# Patient Record
Sex: MALE | Race: WHITE | Employment: FULL TIME | URBAN - METROPOLITAN AREA
[De-identification: names, ages, dates, MRNs, and addresses within clinical notes are randomized per-mention and may not be internally consistent; named-entity substitution may affect disease eponyms.]

---

## 2020-09-16 ENCOUNTER — HOSPITAL ENCOUNTER (EMERGENCY)
Age: 50
Discharge: HOME | End: 2020-09-16
Attending: EMERGENCY MEDICINE | Admitting: EMERGENCY MEDICINE
Payer: COMMERCIAL

## 2020-09-16 VITALS
RESPIRATION RATE: 14 BRPM | OXYGEN SATURATION: 100 % | WEIGHT: 177 LBS | BODY MASS INDEX: 21.55 KG/M2 | HEART RATE: 58 BPM | HEIGHT: 76 IN | SYSTOLIC BLOOD PRESSURE: 140 MMHG | DIASTOLIC BLOOD PRESSURE: 75 MMHG | TEMPERATURE: 97.5 F

## 2020-09-16 DIAGNOSIS — H81.12 BPPV (BENIGN PAROXYSMAL POSITIONAL VERTIGO), LEFT: Primary | ICD-10-CM

## 2020-09-16 LAB
ANION GAP SERPL CALC-SCNC: 8 MMOL/L (ref 5–15)
BASOPHILS # BLD: 0.1 K/UL (ref 0–0.1)
BASOPHILS NFR BLD: 1 % (ref 0–1)
BUN SERPL-MCNC: 17 MG/DL (ref 6–20)
BUN/CREAT SERPL: 16 (ref 12–20)
CALCIUM SERPL-MCNC: 9.8 MG/DL (ref 8.5–10.1)
CHLORIDE SERPL-SCNC: 105 MMOL/L (ref 97–108)
CO2 SERPL-SCNC: 23 MMOL/L (ref 21–32)
COMMENT, HOLDF: NORMAL
CREAT SERPL-MCNC: 1.06 MG/DL (ref 0.7–1.3)
DIFFERENTIAL METHOD BLD: ABNORMAL
EOSINOPHIL # BLD: 0.3 K/UL (ref 0–0.4)
EOSINOPHIL NFR BLD: 3 % (ref 0–7)
ERYTHROCYTE [DISTWIDTH] IN BLOOD BY AUTOMATED COUNT: 11.9 % (ref 11.5–14.5)
GLUCOSE BLD STRIP.AUTO-MCNC: 114 MG/DL (ref 65–100)
GLUCOSE SERPL-MCNC: 131 MG/DL (ref 65–100)
HCT VFR BLD AUTO: 42.4 % (ref 36.6–50.3)
HGB BLD-MCNC: 15.1 G/DL (ref 12.1–17)
IMM GRANULOCYTES # BLD AUTO: 0 K/UL (ref 0–0.04)
IMM GRANULOCYTES NFR BLD AUTO: 0 % (ref 0–0.5)
LYMPHOCYTES # BLD: 1.8 K/UL (ref 0.8–3.5)
LYMPHOCYTES NFR BLD: 17 % (ref 12–49)
MAGNESIUM SERPL-MCNC: 2.1 MG/DL (ref 1.6–2.4)
MCH RBC QN AUTO: 30.8 PG (ref 26–34)
MCHC RBC AUTO-ENTMCNC: 35.6 G/DL (ref 30–36.5)
MCV RBC AUTO: 86.5 FL (ref 80–99)
MONOCYTES # BLD: 0.9 K/UL (ref 0–1)
MONOCYTES NFR BLD: 8 % (ref 5–13)
NEUTS SEG # BLD: 7.2 K/UL (ref 1.8–8)
NEUTS SEG NFR BLD: 71 % (ref 32–75)
NRBC # BLD: 0 K/UL (ref 0–0.01)
NRBC BLD-RTO: 0 PER 100 WBC
PLATELET # BLD AUTO: 417 K/UL (ref 150–400)
PMV BLD AUTO: 10.4 FL (ref 8.9–12.9)
POTASSIUM SERPL-SCNC: 3.6 MMOL/L (ref 3.5–5.1)
RBC # BLD AUTO: 4.9 M/UL (ref 4.1–5.7)
SAMPLES BEING HELD,HOLD: NORMAL
SERVICE CMNT-IMP: ABNORMAL
SODIUM SERPL-SCNC: 136 MMOL/L (ref 136–145)
TROPONIN I SERPL-MCNC: <0.05 NG/ML
WBC # BLD AUTO: 10.2 K/UL (ref 4.1–11.1)

## 2020-09-16 PROCEDURE — 85025 COMPLETE CBC W/AUTO DIFF WBC: CPT

## 2020-09-16 PROCEDURE — 74011250636 HC RX REV CODE- 250/636: Performed by: EMERGENCY MEDICINE

## 2020-09-16 PROCEDURE — 80048 BASIC METABOLIC PNL TOTAL CA: CPT

## 2020-09-16 PROCEDURE — 96374 THER/PROPH/DIAG INJ IV PUSH: CPT

## 2020-09-16 PROCEDURE — 99285 EMERGENCY DEPT VISIT HI MDM: CPT

## 2020-09-16 PROCEDURE — 82962 GLUCOSE BLOOD TEST: CPT

## 2020-09-16 PROCEDURE — 93005 ELECTROCARDIOGRAM TRACING: CPT

## 2020-09-16 PROCEDURE — 84484 ASSAY OF TROPONIN QUANT: CPT

## 2020-09-16 PROCEDURE — 96375 TX/PRO/DX INJ NEW DRUG ADDON: CPT

## 2020-09-16 PROCEDURE — 83735 ASSAY OF MAGNESIUM: CPT

## 2020-09-16 RX ORDER — MECLIZINE HYDROCHLORIDE 25 MG/1
25 TABLET ORAL
Qty: 30 TAB | Refills: 0 | Status: SHIPPED | OUTPATIENT
Start: 2020-09-16 | End: 2020-09-26

## 2020-09-16 RX ORDER — ONDANSETRON 4 MG/1
4 TABLET, ORALLY DISINTEGRATING ORAL
Qty: 12 TAB | Refills: 0 | Status: SHIPPED | OUTPATIENT
Start: 2020-09-16

## 2020-09-16 RX ORDER — MECLIZINE HYDROCHLORIDE 25 MG/1
50 TABLET ORAL
Status: COMPLETED | OUTPATIENT
Start: 2020-09-16 | End: 2020-09-16

## 2020-09-16 RX ORDER — DIAZEPAM 10 MG/2ML
5 INJECTION INTRAMUSCULAR
Status: COMPLETED | OUTPATIENT
Start: 2020-09-16 | End: 2020-09-16

## 2020-09-16 RX ORDER — DIAZEPAM 5 MG/1
5 TABLET ORAL
Qty: 6 TAB | Refills: 0 | Status: SHIPPED | OUTPATIENT
Start: 2020-09-16

## 2020-09-16 RX ORDER — ONDANSETRON 2 MG/ML
4 INJECTION INTRAMUSCULAR; INTRAVENOUS
Status: COMPLETED | OUTPATIENT
Start: 2020-09-16 | End: 2020-09-16

## 2020-09-16 RX ADMIN — DIAZEPAM 5 MG: 5 INJECTION, SOLUTION INTRAMUSCULAR; INTRAVENOUS at 17:28

## 2020-09-16 RX ADMIN — ONDANSETRON 4 MG: 2 INJECTION INTRAMUSCULAR; INTRAVENOUS at 16:08

## 2020-09-16 RX ADMIN — MECLIZINE HYDROCHLORIDE 50 MG: 25 TABLET ORAL at 16:24

## 2020-09-16 NOTE — ED TRIAGE NOTES
Patient reports feeling nauseous, weak, and lightheaded at work today with episode of vomiting. Reports \" it feels like my hands and arms are vibrating\". Patient to triage in wheel chair with head in lap, and states he feels like the room is spinning.

## 2020-09-16 NOTE — ED PROVIDER NOTES
Dizziness   This is a new problem. The current episode started 3 to 5 hours ago. The problem has been rapidly worsening. Affected Side: worse with leftward motion. Pertinent negatives include no focal weakness, no loss of sensation, no speech difficulty, no visual change, no mental status change and no disorientation. There has been no fever. Associated symptoms include nausea. Pertinent negatives include no altered mental status. Associated medical issues do not include CVA. Nausea    This is a new problem. The current episode started 6 to 12 hours ago. The problem occurs continuously. The problem has been rapidly worsening. Pertinent negatives include no abdominal pain. Associated symptoms comments: Vomiting intermittently. Fatigue   This is a new problem. The current episode started 6 to 12 hours ago. The problem has not changed since onset. Pertinent negatives include no focal weakness, no loss of sensation, no speech difficulty, no visual change, no mental status change and no disorientation. Associated symptoms include nausea. Pertinent negatives include no altered mental status. There were no medications administered prior to arrival. Associated medical issues do not include CVA.         Past Medical History:   Diagnosis Date    Depression     Ill-defined condition     chronic headaches    Migraines        Past Surgical History:   Procedure Laterality Date    HX ORTHOPAEDIC Left 2006    Partial Replacement Knee         Family History:   Problem Relation Age of Onset    Heart Disease Father     Hypertension Father        Social History     Socioeconomic History    Marital status:      Spouse name: Not on file    Number of children: Not on file    Years of education: Not on file    Highest education level: Not on file   Occupational History    Not on file   Social Needs    Financial resource strain: Not on file    Food insecurity     Worry: Not on file     Inability: Not on file   [place] Transportation needs     Medical: Not on file     Non-medical: Not on file   Tobacco Use    Smoking status: Never Smoker    Smokeless tobacco: Never Used   Substance and Sexual Activity    Alcohol use: Yes     Comment: socially    Drug use: Not on file    Sexual activity: Not on file   Lifestyle    Physical activity     Days per week: Not on file     Minutes per session: Not on file    Stress: Not on file   Relationships    Social connections     Talks on phone: Not on file     Gets together: Not on file     Attends Jewish service: Not on file     Active member of club or organization: Not on file     Attends meetings of clubs or organizations: Not on file     Relationship status: Not on file    Intimate partner violence     Fear of current or ex partner: Not on file     Emotionally abused: Not on file     Physically abused: Not on file     Forced sexual activity: Not on file   Other Topics Concern    Not on file   Social History Narrative    Not on file         ALLERGIES: Percocet [oxycodone-acetaminophen]    Review of Systems   Constitutional: Positive for fatigue. Gastrointestinal: Positive for nausea. Negative for abdominal pain. Neurological: Positive for dizziness. Negative for focal weakness and speech difficulty. All other systems reviewed and are negative. Vitals:    09/16/20 1535   BP: (!) 156/89   Pulse: (!) 110   Resp: 18   Temp: 97.5 °F (36.4 °C)   SpO2: 100%   Weight: 80.3 kg (177 lb)   Height: 6' 4\" (1.93 m)            Physical Exam  Vitals signs and nursing note reviewed. Constitutional:       General: He is not in acute distress. Appearance: He is well-developed. HENT:      Head: Normocephalic and atraumatic. Eyes:      Conjunctiva/sclera: Conjunctivae normal.   Neck:      Musculoskeletal: Neck supple. Trachea: No tracheal deviation. Cardiovascular:      Rate and Rhythm: Normal rate and regular rhythm.    Pulmonary:      Effort: Pulmonary effort is normal. No respiratory distress. Abdominal:      General: There is no distension. Musculoskeletal: Normal range of motion. General: No deformity. Skin:     General: Skin is warm and dry. Neurological:      Mental Status: He is alert. GCS: GCS eye subscore is 4. GCS verbal subscore is 5. GCS motor subscore is 6. Cranial Nerves: No cranial nerve deficit or facial asymmetry. Motor: Motor function is intact. Coordination: Coordination normal.      Comments: + left head impulse test, + left aden-halpike   Psychiatric:         Behavior: Behavior normal.          MDM     51 y.o. male presents with nausea starting earlier today and feeling poorly followed by waves of severe vertigo and nausea/vomiting. He has a left lateralized peripheral vertigo. No central features. No significant hematologic or metabolic abnormalities to explain symptoms. Cardiac workup unremarkable. Couldn't tolerate meclizine with persistent vomiting, resolved after IV valium. Provided meclizine and valium for home care of vertigo and discussed epley maneuvers although he failed to improve after 3 attempts here. Plan to follow up with PCP as needed and return precautions discussed for worsening or new concerning symptoms. Procedures    EKG 1556: Rate 64, Normal sinus rhythm, No ST segment or T wave abnormalities. Normal EKG.

## 2020-09-16 NOTE — ED NOTES
Pt reports nausea and dizziness \"are a little better, but I still get dizzy when I turn my head. \"

## 2020-09-16 NOTE — ED NOTES
Patient does not appear to be in any acute distress/shows no evidence of clinical instability at this time. Provider has reviewed discharge instructions with the patient/family. The patient/family verbalized understanding instructions as well as need for follow up for any further symptoms.     Discharge papers given, education provided, and any questions answered. Patient discharged by provider. Ambulatory out of department, unassisted. Accompanied by spouse.

## 2020-09-18 LAB
ATRIAL RATE: 64 BPM
CALCULATED P AXIS, ECG09: 51 DEGREES
CALCULATED R AXIS, ECG10: 72 DEGREES
CALCULATED T AXIS, ECG11: 59 DEGREES
DIAGNOSIS, 93000: NORMAL
P-R INTERVAL, ECG05: 148 MS
Q-T INTERVAL, ECG07: 460 MS
QRS DURATION, ECG06: 98 MS
QTC CALCULATION (BEZET), ECG08: 474 MS
VENTRICULAR RATE, ECG03: 64 BPM

## 2022-05-09 ENCOUNTER — OFFICE VISIT (OUTPATIENT)
Dept: ORTHOPEDIC SURGERY | Age: 52
End: 2022-05-09
Payer: COMMERCIAL

## 2022-05-09 DIAGNOSIS — M25.462 EFFUSION, LEFT KNEE: Primary | ICD-10-CM

## 2022-05-09 PROCEDURE — 20610 DRAIN/INJ JOINT/BURSA W/O US: CPT | Performed by: ORTHOPAEDIC SURGERY

## 2022-05-09 NOTE — PROGRESS NOTES
Willem Helms (: 1970) is a 49 y.o. male, patient, here for evaluation of the following chief complaint(s):  Knee Pain (left knee pain)       HPI:    She presents the office today with chief plaint of left knee pain. This patient well-known to my office. He has had recurrent effusion and further progression of osteoarthritis the left knee. With activity, he has noted some recurrent swelling of the knee. He is here today for further advice. Allergies   Allergen Reactions    Percocet [Oxycodone-Acetaminophen] Other (comments)     vomiting       Current Outpatient Medications   Medication Sig    diazePAM (Valium) 5 mg tablet Take 1 Tab by mouth every twelve (12) hours as needed (vertigo). Max Daily Amount: 10 mg.    ondansetron (Zofran ODT) 4 mg disintegrating tablet Take 1 Tab by mouth every eight (8) hours as needed for Nausea.  topiramate (TOPAMAX) 25 mg tablet Take 25 mg by mouth nightly.  FLUoxetine (PROZAC) 20 mg capsule Take 20 mg by mouth daily.  neomycin-bacitracnZn-polymyxnB (NEOSPORIN) 3.5-400-5,000 mg-unit-unit oipk ointment Apply 10 Packets to affected area two (2) times a day.  amoxicillin-clavulanate (AUGMENTIN) 875-125 mg per tablet Take 1 Tab by mouth two (2) times a day.  zolpidem (AMBIEN) 10 mg tablet Take  by mouth nightly as needed for Sleep.  MULTIVITAMIN PO Take  by mouth.  CALCIUM CARBONATE (ANTACID CALCIUM PO) Take  by mouth. No current facility-administered medications for this visit.        Past Medical History:   Diagnosis Date    Depression     Ill-defined condition     chronic headaches    Migraines         Past Surgical History:   Procedure Laterality Date    HX ORTHOPAEDIC Left 2006    Partial Replacement Knee       Family History   Problem Relation Age of Onset    Heart Disease Father     Hypertension Father         Social History     Socioeconomic History    Marital status:      Spouse name: Not on file    Number of children: Not on file    Years of education: Not on file    Highest education level: Not on file   Occupational History    Not on file   Tobacco Use    Smoking status: Never Smoker    Smokeless tobacco: Never Used   Substance and Sexual Activity    Alcohol use: Yes     Comment: socially    Drug use: Not on file    Sexual activity: Not on file   Other Topics Concern    Not on file   Social History Narrative    Not on file     Social Determinants of Health     Financial Resource Strain:     Difficulty of Paying Living Expenses: Not on file   Food Insecurity:     Worried About Running Out of Food in the Last Year: Not on file    [place] of Food in the Last Year: Not on file   Transportation Needs:     Lack of Transportation (Medical): Not on file    Lack of Transportation (Non-Medical): Not on file   Physical Activity:     Days of Exercise per Week: Not on file    Minutes of Exercise per Session: Not on file   Stress:     Feeling of Stress : Not on file   Social Connections:     Frequency of Communication with Friends and Family: Not on file    Frequency of Social Gatherings with Friends and Family: Not on file    Attends Tenriism Services: Not on file    Active Member of Clubs or Organizations: Not on file    Attends Club or Organization Meetings: Not on file    Marital Status: Not on file   Intimate Partner Violence:     Fear of Current or Ex-Partner: Not on file    Emotionally Abused: Not on file    Physically Abused: Not on file    Sexually Abused: Not on file   Housing Stability:     Unable to Pay for Housing in the Last Year: Not on file    Number of [place] in the Last Year: Not on file    Unstable Housing in the Last Year: Not on file       Review of Systems   Musculoskeletal:        Left knee pain       Vitals: There were no vitals taken for this visit. There is no height or weight on file to calculate BMI. Ortho Exam     Patient is alert and oriented x3.  Patient is in no acute distress. Patient ambulates with a nonantalgic gait. Left knee: Incision from prior surgery healed well. 2+ knee effusion. Mild valgus deformity. Crepitation throughout range of motion. He has no instability. His quad tone and strength is normal.  Neurovascular examination is intact. Right knee: There is no abrasions, lacerations, ecchymosis or soft tissue swelling. No effusion is identified. There is no pain to palpation along the medial or lateral border of the patella. There is no pain or crepitation with manipulation of the patella. There is normal excursion of the patella. Patellar grind test is negative. Active and passive range of motion is full and does not cause pain or crepitation. There is no pain with palpation along the medial femoral epicondyle or medial tibia and no pain with palpation over the lateral femoral epicondyle. There is no medial or lateral joint line tenderness. Manjinder's maneuver is negative. There is no collateral ligament instability. Anterior drawer, Lachman and posterior drawer are negative. There is no soft tissue swelling distally into the leg. Neurocirculatory examination is intact. Prior x-rays once again reveal evidence of prior partial knee replacement of the left with further  osteoarthritic changes    ASSESSMENT/PLAN:    Patient returns to the office with recurrent effusion of the knee. We have discussed treatment options and he is elected to proceed with aspiration. He has had success with this before in the past.  After consent and under sterile prep, the left knee was aspirated approximately 37 cc of sterile fluid. He is to ice and modify his activity. We did briefly talk about total knee replacement. We have talked about this before in the past.  He is considering this sometime in October. Of gone over the surgical indication and technique. We discussed the risks and benefits. The risks and benefits were described to the patient.   The patient understands there is a risk of infection, postoperative pain, numbness, tingling, stiffness DVT, PE, MI, CVA and any other unforeseen events. The patient also understands there is a long rehabilitative process that typically follows the surgical procedure. We talked about the possibility of not being able to alleviate all of the discomfort. Also, I explained  there is no guarantee all function and strength will return.   Patient is to call or return to the office if he would like to proceed we should be able to set this up for him over the phone      Seamus Hall MD

## 2022-05-09 NOTE — LETTER
5/9/2022    Patient: Pinky Hoffman   YOB: 1970   Date of Visit: 5/9/2022     Ivania Vargas MD  [address] [place] [address] [address] [id number]  Via Fax: [phone]    Dear Ivania Vargas MD,      Thank you for referring Mr. Pinky Hoffman to [facility] for evaluation. My notes for this consultation are attached. If you have questions, please do not hesitate to call me. I look forward to following your patient along with you.       Sincerely,    Fredy De León MD

## 2022-11-04 ENCOUNTER — HOSPITAL ENCOUNTER (OUTPATIENT)
Dept: PREADMISSION TESTING | Age: 52
Discharge: HOME | End: 2022-11-04
Payer: COMMERCIAL

## 2022-11-04 VITALS
HEART RATE: 47 BPM | HEIGHT: 76 IN | BODY MASS INDEX: 21.4 KG/M2 | TEMPERATURE: 98 F | SYSTOLIC BLOOD PRESSURE: 149 MMHG | DIASTOLIC BLOOD PRESSURE: 89 MMHG | WEIGHT: 175.71 LBS

## 2022-11-04 LAB
ABO + RH BLD: NORMAL
ANION GAP SERPL CALC-SCNC: 5 MMOL/L (ref 5–15)
APPEARANCE UR: CLEAR
ATRIAL RATE: 48 BPM
BACTERIA URNS QL MICRO: NEGATIVE /HPF
BILIRUB UR QL: NEGATIVE
BLOOD GROUP ANTIBODIES SERPL: NORMAL
BUN SERPL-MCNC: 18 MG/DL (ref 6–20)
BUN/CREAT SERPL: 21 (ref 12–20)
CALCIUM SERPL-MCNC: 9.4 MG/DL (ref 8.5–10.1)
CALCULATED P AXIS, ECG09: 69 DEGREES
CALCULATED R AXIS, ECG10: 77 DEGREES
CALCULATED T AXIS, ECG11: 73 DEGREES
CHLORIDE SERPL-SCNC: 106 MMOL/L (ref 97–108)
CO2 SERPL-SCNC: 29 MMOL/L (ref 21–32)
COLOR UR: NORMAL
CREAT SERPL-MCNC: 0.87 MG/DL (ref 0.7–1.3)
DIAGNOSIS, 93000: NORMAL
EPITH CASTS URNS QL MICRO: NORMAL /LPF
ERYTHROCYTE [DISTWIDTH] IN BLOOD BY AUTOMATED COUNT: 13 % (ref 11.5–14.5)
EST. AVERAGE GLUCOSE BLD GHB EST-MCNC: 105 MG/DL
GLUCOSE SERPL-MCNC: 96 MG/DL (ref 65–100)
GLUCOSE UR STRIP.AUTO-MCNC: NEGATIVE MG/DL
HBA1C MFR BLD: 5.3 % (ref 4–5.6)
HCT VFR BLD AUTO: 39.8 % (ref 36.6–50.3)
HGB BLD-MCNC: 13.7 G/DL (ref 12.1–17)
HGB UR QL STRIP: NEGATIVE
HYALINE CASTS URNS QL MICRO: NORMAL /LPF (ref 0–5)
INR PPP: 1 (ref 0.9–1.1)
KETONES UR QL STRIP.AUTO: NEGATIVE MG/DL
LEUKOCYTE ESTERASE UR QL STRIP.AUTO: NEGATIVE
MCH RBC QN AUTO: 32.1 PG (ref 26–34)
MCHC RBC AUTO-ENTMCNC: 34.4 G/DL (ref 30–36.5)
MCV RBC AUTO: 93.2 FL (ref 80–99)
NITRITE UR QL STRIP.AUTO: NEGATIVE
NRBC # BLD: 0 K/UL (ref 0–0.01)
NRBC BLD-RTO: 0 PER 100 WBC
P-R INTERVAL, ECG05: 152 MS
PH UR STRIP: 6.5 [PH] (ref 5–8)
PLATELET # BLD AUTO: 314 K/UL (ref 150–400)
PMV BLD AUTO: 10.2 FL (ref 8.9–12.9)
POTASSIUM SERPL-SCNC: 4.4 MMOL/L (ref 3.5–5.1)
PROT UR STRIP-MCNC: NEGATIVE MG/DL
PROTHROMBIN TIME: 10.8 SEC (ref 9–11.1)
Q-T INTERVAL, ECG07: 468 MS
QRS DURATION, ECG06: 100 MS
QTC CALCULATION (BEZET), ECG08: 418 MS
RBC # BLD AUTO: 4.27 M/UL (ref 4.1–5.7)
RBC #/AREA URNS HPF: NORMAL /HPF (ref 0–5)
SODIUM SERPL-SCNC: 140 MMOL/L (ref 136–145)
SP GR UR REFRACTOMETRY: 1.01 (ref 1–1.03)
SPECIMEN EXP DATE BLD: NORMAL
UA: UC IF INDICATED,UAUC: NORMAL
UROBILINOGEN UR QL STRIP.AUTO: 1 EU/DL (ref 0.2–1)
VENTRICULAR RATE, ECG03: 48 BPM
WBC # BLD AUTO: 4.1 K/UL (ref 4.1–11.1)
WBC URNS QL MICRO: NORMAL /HPF (ref 0–4)

## 2022-11-04 PROCEDURE — 80048 BASIC METABOLIC PNL TOTAL CA: CPT

## 2022-11-04 PROCEDURE — 81001 URINALYSIS AUTO W/SCOPE: CPT

## 2022-11-04 PROCEDURE — 85610 PROTHROMBIN TIME: CPT

## 2022-11-04 PROCEDURE — 93005 ELECTROCARDIOGRAM TRACING: CPT

## 2022-11-04 PROCEDURE — 83036 HEMOGLOBIN GLYCOSYLATED A1C: CPT

## 2022-11-04 PROCEDURE — 86900 BLOOD TYPING SEROLOGIC ABO: CPT

## 2022-11-04 PROCEDURE — 85027 COMPLETE CBC AUTOMATED: CPT

## 2022-11-04 PROCEDURE — 36415 COLL VENOUS BLD VENIPUNCTURE: CPT

## 2022-11-04 RX ORDER — CETIRIZINE HYDROCHLORIDE 10 MG/1
CAPSULE, LIQUID FILLED ORAL
COMMUNITY

## 2022-11-04 RX ORDER — DEXTROAMPHETAMINE SACCHARATE, AMPHETAMINE ASPARTATE, DEXTROAMPHETAMINE SULFATE AND AMPHETAMINE SULFATE 5; 5; 5; 5 MG/1; MG/1; MG/1; MG/1
20 TABLET ORAL 2 TIMES DAILY
COMMUNITY

## 2022-11-04 RX ORDER — LEVOTHYROXINE SODIUM 50 UG/1
TABLET ORAL
COMMUNITY

## 2022-11-04 RX ORDER — MAGNESIUM CARB,CITRATE,OXIDE 300 MG
TABLET ORAL
COMMUNITY

## 2022-11-04 RX ORDER — METHOTREXATE 2.5 MG/1
20 TABLET ORAL
COMMUNITY

## 2022-11-04 RX ORDER — BUPROPION HYDROCHLORIDE 150 MG/1
TABLET, EXTENDED RELEASE ORAL 2 TIMES DAILY
COMMUNITY

## 2022-11-04 RX ORDER — TRAZODONE HYDROCHLORIDE 50 MG/1
TABLET ORAL
COMMUNITY

## 2022-11-04 RX ORDER — FOLIC ACID 1 MG/1
TABLET ORAL DAILY
COMMUNITY

## 2022-11-04 RX ORDER — PROPRANOLOL HYDROCHLORIDE 60 MG/1
CAPSULE, EXTENDED RELEASE ORAL
COMMUNITY

## 2022-11-04 RX ORDER — LIOTHYRONINE SODIUM 5 UG/1
5 TABLET ORAL
COMMUNITY

## 2022-11-04 NOTE — PERIOP NOTES
[address] [facility] INSTRUCTIONS  ORTHOPAEDIC    Surgery Date:   11/10/2022    Your surgeon's office or [facility] staff will call you between 4 PM- 8 PM the day before surgery with your arrival time. If your surgery is on a Monday, you will receive a call the preceding Friday. Please report to [facility] Patient Access/Admitting on the 1st floor. Bring your insurance card, photo identification, and any copayment (if applicable). If you are going home the same day of your surgery, you must have a responsible adult to drive you home. You need to have a responsible adult to stay with you the first 24 hours after surgery and you should not drive a car for 24 hours following your surgery. Do NOT eat any solid foods after midnight the night before surgery including candy, mints or gum. You may drink clear liquids from midnight until 1 hour prior to arrival time. You may drink up to 12 ounces at one time every 4 hours. Do NOT drink alcohol or smoke 24 hours before surgery. STOP smoking for 14 days prior as it helps with breathing and healing after surgery. If your arrival time is 3pm or later, you may eat a light breakfast before 8am (toast, bagel-no butter, black coffee, plain tea, fruit juice-no pulp) Please note special instructions, if applicable, below for medications. If you are being admitted to the hospital,please leave personal belongings/luggage in your car until you have an assigned hospital room number. Please wear comfortable clothes. Wear your glasses instead of contacts. We ask that all money, jewelry and valuables be left at home. Wear no make up, particularly mascara, the day of surgery. All body piercings, rings, and jewelry need to be removed and left at home. Please remove any nail polish or artificial nails from your fingernails. Please wear your hair loose or down. Please no pony-tails, buns, or any metal hair accessories.  If you shower the morning of surgery, please do not apply any lotions or powders afterwards. You may wear deodorant. Do not shave any body area within 24 hours of your surgery. Please follow all instructions to avoid any potential surgical cancellation. Should your physical condition change, (i.e. fever, cold, flu, etc.) please notify your surgeon as soon as possible. It is important to be on time. If a situation occurs where you may be delayed, please call:  [phone] / [phone] on the day of surgery. The Preadmission Testing staff can be reached at [phone]. Special instructions: BRING ADVANCE DIRECTIVE    Current Outpatient Medications   Medication Sig    dextroamphetamine-amphetamine (AdderalL) 20 mg tablet Take 20 mg by mouth two (2) times a day. buPROPion SR (WELLBUTRIN SR) 150 mg SR tablet Take  by mouth two (2) times a day. propranolol LA (INDERAL LA) 60 mg SR capsule Take  by mouth nightly. liothyronine (CYTOMEL) 5 mcg tablet Take 5 mcg by mouth Daily (before breakfast). levothyroxine (SYNTHROID) 50 mcg tablet Take  by mouth Daily (before breakfast). methotrexate (RHEUMATREX) 2.5 mg tablet Take 20 mg by mouth every Wednesday. folic acid (FOLVITE) 1 mg tablet Take  by mouth daily. traZODone (DESYREL) 50 mg tablet Take  by mouth nightly as needed for Sleep. Cetirizine (ZyrTEC) 10 mg cap Take  by mouth nightly.    magnesium carb,citrate,oxide (Magnesium Complex) 300 mg magnesium tab Take  by mouth nightly. meclizine 50 mg tablet Take 50 mg by mouth two (2) times daily as needed. diazePAM (Valium) 5 mg tablet Take 1 Tab by mouth every twelve (12) hours as needed (vertigo). Max Daily Amount: 10 mg. MULTIVITAMIN PO Take  by mouth. CALCIUM CARBONATE (ANTACID CALCIUM PO) Take  by mouth as needed. No current facility-administered medications for this encounter.        YOU MUST ONLY TAKE THESE MEDICATIONS THE MORNING OF SURGERY WITH A SIP OF WATER: WELLBUTRIN, THYROID MEDICINES  MEDICATIONS TO TAKE THE MORNING OF SURGERY ONLY IF NEEDED: ANTACID, VALIUM  HOLD these prescription medications BEFORE Surgery: METHOTREXATE AS DIRECTED    Stop any non-steroidal anti-inflammatory drugs (i.e. Ibuprofen, Naproxen, Advil, Aleve) 3 days before surgery. You may take Tylenol. STOP all vitamins and herbal supplements 1 week prior to  surgery. If you are currently taking Plavix, Coumadin, or any other blood-thinning/anticoagulant medication contact your prescribing physician for instructions. Preventing Infections Before and After - Your Surgery    IMPORTANT INSTRUCTIONS    You play an important role in your health and preparation for surgery. To reduce the germs on your skin you will need to shower with CHG soap (Chorhexidine gluconate 4%) two times before surgery. CHG soap (Hibiclens, Hex-A-Clens or store brand)  CHG soap will be provided at your Preadmission Testing (PAT) appointment. If you do not have a PAT appointment before surgery, you may arrange to  CHG soap from our office or purchase CHG soap at a pharmacy, grocery or department store. You need to purchase TWO 4 ounce bottles to use for your 2 showers. Steps to follow:  Fredrich Pen your hair with your normal shampoo and your body with regular soap and rinse well to remove shampoo and soap from your skin. Wet a clean washcloth and turn off the shower. Put CHG soap on washcloth and apply to your entire body from the neck down. Do not use on your head, face or private parts(genitals). Do not use CHG soap on open sores, wounds or areas of skin irritation. Wash you body gently for 5 minutes. Do not wash your skin too hard. This soap does not create lather. Pay special attention to your underarms and from your belly button to your feet. Turn the shower back on and rinse well to get CHG soap off your body. Pat your skin dry with a clean, dry towel. Do not apply lotions or moisturizer.   Put on clean clothes and sleep on fresh bed sheets and do not allow pets to sleep with you. Shower with CHG soap 2 times before your surgery  The evening before your surgery  The morning of your surgery      Tips to help prevent infections after your surgery:  Protect your surgical wound from germs:  Hand washing is the most important thing you and your caregivers can do to prevent infections. Keep your bandage clean and dry! Do not touch your surgical wound. Use clean, freshly washed towels and washcloths every time you shower; do not share bath linens with others. Until your surgical wound is healed, wear clothing and sleep on bed linens each day that are clean and freshly washed. Do not allow pets to sleep in your bed with you or touch your surgical wound. Do not smoke - smoking delays wound healing. This may be a good time to stop smoking. If you have diabetes, it is important for you to manage your blood sugar levels properly before your surgery as well as after your surgery. Poorly managed blood sugar levels slow down wound healing and prevent you from healing completely. Prevention of Infection  Testing for Staphylococcus aureus on your skin before surgery    Staphylococcus aureus (staph) is a common bacteria that is found on the body. It normally does not cause infection on healthy skin. Before surgery, you will be tested to see if you have staph by swabbing the inside of your nose. When you have an incision with surgery, the goal is to protect that incision from infection. Removal of the staph bacteria before surgery can decrease the risk of a surgical site infection. If your nose swab is positive for staph you will be called. Your treatment will include 2 steps:  Prescription for Mupirocin ointment to be used in each nostril twice a day for 5 days. Showering with Chlorhexidine (CHG) liquid soap for 5 days prior to surgery. How to use Mupirocin ointment in your nose   the prescription from your pharmacy.  You will receive a large tube of ointment which will be big enough for all of your treatments. You will apply this ointment to each nostril 2 times a day for 5 days. Wash your hands with  gel or soap and water for 20 seconds before using ointment. Place a pea-sized amount of ointment on a cotton Q-tip. Apply ointment just inside of each nostril with the Q-tip. Do not push Q-tip or ointment deep inside you nose. Press your nostrils together and massage for a few seconds. Wash your hands with  gel or soap and water after you are finished. Do not get ointment near your eyes. If it gets into your eyes, rinse them with cool water. If you need to use nasal spray, clean the tip of the bottle with alcohol before use and do not use both at the same time. If you are scheduled for COVID testing during the 5 days, do NOT apply morning dose until after the COVID test has been performed. How to use Chlorhexidine (CHG) 4% liquid soap  Purchase an 8 ounce bottle of CHG liquid soap (Chlorhexidine 4%, Hibiclens, Hex-A-Clens or store brand) at a pharmacy or grocery store. Wash your hair with your normal shampoo and your body with regular soap and rinse well to remove shampoo and soap from your skin. Wet a clean washcloth and turn off the shower. Put CHG soap on washcloth and apply to your entire body from the neck down. Do not use on your head, face or private parts(genitals). Do not use CHG soap on open sores, wounds or areas of skin irritation. Wash your body gently for 5 minutes. Do not wash your skin too hard. This soap does not create lather. Pay special attention to your underarms and from your belly button to your feet. Turn the shower back on and rinse well to get CHG soap off your body. Pat your skin dry with a clean, dry towel. Do not apply lotions or moisturizer. Put on clean clothes and sleep on fresh bed sheets the night before surgery. Do not allow pets to sleep with you.     Eating and Drinking Before Surgery    You may eat a regular dinner at the usual time on the day before your surgery. Do NOT eat any solid foods after midnight unless your arrival time at the hospital is 3pm or later. You may drink clear liquids only from 12 midnight until 1 hours prior to your arrival time at the hospital on the day of your surgery. Do NOT drink alcohol. Clear liquids include:  Water  Fruit juices without pulp( i.e. apple juice)  Carbonated beverages  Black coffee (no cream/milk)  Tea (no cream/milk)  Gatorade  You may drink up to 12-16 ounces at one time every 4 hours between the hours of midnight and 1 hour before your arrival time at the hospital. Example- if your arrival time at the hospital is 6am, you may drink 12-16 ounces of clear liquids no later than 5am.  If your arrival time at the hospital is 3pm or later, you may eat a light breakfast before 8am.  A light breakfast includes: Toast or bagel (no butter)  Black coffee (no cream/milk)  Tea (no cream/milk)  Fruit juices without pulp ( i.e. apple juice)  Do NOT eat meat, eggs, vegetables or fruit  If you have any questions, please contact your surgeon's office. Patient Information Regarding COVID Restrictions    Day of Procedure    Please park in the parking deck or any designated visitor parking lot. Enter the facility through the Main Entrance of the hospital.  On the day of surgery, please provide the cell phone number of the person who will be waiting for you to the Patient Access representative at the time of registration. Masks are highly recommended in the hospital, but not required. Once your procedure and the immediate recovery period is completed, a nurse in the recovery area will contact your designated visitor to inform them of your room number or to otherwise review other pertinent information regarding your care. Social distancing practices are strongly encouraged in waiting areas and the cafeteria. The patient was contacted in person.    He verbalized understanding of all instructions does not  need reinforcement.

## 2022-11-05 LAB
BACTERIA SPEC CULT: NORMAL
BACTERIA SPEC CULT: NORMAL
SERVICE CMNT-IMP: NORMAL

## 2022-11-07 NOTE — PERIOP NOTES
PAT Nurse Practitioner   Pre-Operative Chart Review/Assessment:-ORTHOPEDIC                Patient Name:  Yamileth Chin                                                           Age:   49 y.o.    :  1970     Today's Date:  2022     Date of PAT:   22      Date of Surgery:    11/10/22      Procedure(s):  Left Total Knee Arthroplasty REVISION     Surgeon:   Dr. Donny Rubio                       PLAN:      1)  Medical Clearance/PCP:  Hernesto Tineo MD      2)  Cardiac Clearance:  EKG and METs reviewed. No further cardiac evaluation requested. PAT EKG showed sinus reinier. 3)  Diabetic Treatment Consult:  Not indicated. A1c-5.3      4)  Sleep Apnea evaluation:   Not indicated. VANESSA Score 2.       5) Treatment for MRSA/Staph Aureus:  Neg      6) Additional Concerns:  PONV, HTN, GERD, RA, depression, migraines                Vital Signs:         Vitals:    22 0911   BP: (!) 149/89   Pulse: (!) 47   Temp: 98 °F (36.7 °C)   Weight: 79.7 kg (175 lb 11.3 oz)   Height: 6' 4\" (1.93 m)          Body mass index is 21.39 kg/m².         ____________________________________________  PAST MEDICAL HISTORY  Past Medical History:   Diagnosis Date    Arthritis     Left knee    Autoimmune disease ([address] ? Arthritis specialist    Depression     GERD (gastroesophageal reflux disease) 10/2014    As needed    Hypertension ? Secondary to mental health medications    Migraines     Migrainous vertigo     Nausea & vomiting 6/15/1989    After every surgery    Raynaud's disease     Thyroid disease ?     Unsure of actual date      ____________________________________________  PAST SURGICAL HISTORY  Past Surgical History:   Procedure Laterality Date    HX KNEE ARTHROSCOPY Left     X5    HX ORTHOPAEDIC Left 2006    Partial Replacement Knee    NE OSTEOCHONDRAL KNEE ALLOGRAFT Left       ____________________________________________  HOME MEDICATIONS  Current Outpatient Medications   Medication Sig dextroamphetamine-amphetamine (AdderalL) 20 mg tablet Take 20 mg by mouth two (2) times a day. buPROPion SR (WELLBUTRIN SR) 150 mg SR tablet Take  by mouth two (2) times a day. propranolol LA (INDERAL LA) 60 mg SR capsule Take  by mouth nightly. liothyronine (CYTOMEL) 5 mcg tablet Take 5 mcg by mouth Daily (before breakfast). levothyroxine (SYNTHROID) 50 mcg tablet Take  by mouth Daily (before breakfast). methotrexate (RHEUMATREX) 2.5 mg tablet Take 20 mg by mouth every Wednesday. folic acid (FOLVITE) 1 mg tablet Take  by mouth daily. traZODone (DESYREL) 50 mg tablet Take  by mouth nightly as needed for Sleep. Cetirizine (ZyrTEC) 10 mg cap Take  by mouth nightly.    magnesium carb,citrate,oxide (Magnesium Complex) 300 mg magnesium tab Take  by mouth nightly. meclizine 50 mg tablet Take 50 mg by mouth two (2) times daily as needed. diazePAM (Valium) 5 mg tablet Take 1 Tab by mouth every twelve (12) hours as needed (vertigo). Max Daily Amount: 10 mg. MULTIVITAMIN PO Take  by mouth. CALCIUM CARBONATE (ANTACID CALCIUM PO) Take  by mouth as needed. No current facility-administered medications for this encounter.      ____________________________________________  ALLERGIES  Allergies   Allergen Reactions    Percocet [Oxycodone-Acetaminophen] Other (comments)     vomiting      ____________________________________________  SOCIAL HISTORY  Social History     Tobacco Use    Smoking status: Never    Smokeless tobacco: Never   Substance Use Topics    Alcohol use:  Yes     Alcohol/week: 2.0 standard drinks     Types: 2 Cans of beer per week     Comment: socially      ____________________________________________   Internal Administration   First Dose COVID-19, PFIZER PURPLE top, DILUTE for use, (age 15 y+), IM, 30mcg/0.3mL  03/22/2021   Second Dose COVID-19, PFIZER PURPLE top, DILUTE for use, (age 15 y+), IM, 30mcg/0.3mL  04/12/2021      Last COVID Lab No results found for: Melinda Pitts, RCV2CT, CVD2M, COV2, XPLCVT, [id number], [id number], COVV, Margaret Chalet, [id number] [employer] [place]                 Labs:     Hospital Outpatient Visit on 11/04/2022   Component Date Value Ref Range Status    Sodium 11/04/2022 140  136 - 145 mmol/L Final    Potassium 11/04/2022 4.4  3.5 - 5.1 mmol/L Final    Chloride 11/04/2022 106  97 - 108 mmol/L Final    CO2 11/04/2022 29  21 - 32 mmol/L Final    Anion gap 11/04/2022 5  5 - 15 mmol/L Final    Glucose 11/04/2022 96  65 - 100 mg/dL Final    BUN 11/04/2022 18  6 - 20 MG/DL Final    Creatinine 11/04/2022 0.87  0.70 - 1.30 MG/DL Final    BUN/Creatinine ratio 11/04/2022 21 (A)  12 - 20   Final    eGFR 11/04/2022 >60  >60 ml/min/1.73m2 Final    Comment:      Pediatric calculator link: Amy.at. org/professionals/kdoqi/gfr_calculatorped       Effective Oct 3, 2022       These results are not intended for use in patients <25years of age. eGFR results are calculated without a race factor using  the 2021 CKD-EPI equation. Careful clinical correlation is recommended, particularly when comparing to results calculated using previous equations. The CKD-EPI equation is less accurate in patients with extremes of muscle mass, extra-renal metabolism of creatinine, excessive creatine ingestion, or following therapy that affects renal tubular secretion. Calcium 11/04/2022 9.4  8.5 - 10.1 MG/DL Final    WBC 11/04/2022 4.1  4.1 - 11.1 K/uL Final    RBC 11/04/2022 4.27  4. 10 - 5.70 M/uL Final    HGB 11/04/2022 13.7  12.1 - 17.0 g/dL Final    HCT 11/04/2022 39.8  36.6 - 50.3 % Final    MCV 11/04/2022 93.2  80.0 - 99.0 FL Final    MCH 11/04/2022 32.1  26.0 - 34.0 PG Final    MCHC 11/04/2022 34.4  30.0 - 36.5 g/dL Final    RDW 11/04/2022 13.0  11.5 - 14.5 % Final    PLATELET 16/69/5757 370  150 - 400 K/uL Final    MPV 11/04/2022 10.2  8.9 - 12.9 FL Final    NRBC 11/04/2022 0.0  0  WBC Final    ABSOLUTE NRBC 11/04/2022 0.00  0.00 - 0.01 K/uL Final    Crossmatch Expiration 11/04/2022 11/13/2022,2359   Final    ABO/Rh(D) 11/04/2022 B POSITIVE   Final    Antibody screen 11/04/2022 NEG   Final    INR 11/04/2022 1.0  0.9 - 1.1   Final    A single therapeutic range for Vit K antagonists may not be optimal for all indications - see June, 2008 issue of Chest, American College of Chest Physicians Evidence-Based Clinical Practice Guidelines, 8th Edition. Prothrombin time 11/04/2022 10.8  9.0 - 11.1 sec Final    Color 11/04/2022 YELLOW/STRAW    Final    Color Reference Range: Straw, Yellow or Dark Yellow    Appearance 11/04/2022 CLEAR  CLEAR   Final    Specific gravity 11/04/2022 1.014  1.003 - 1.030   Final    pH (UA) 11/04/2022 6.5  5.0 - 8.0   Final    Protein 11/04/2022 Negative  NEG mg/dL Final    Glucose 11/04/2022 Negative  NEG mg/dL Final    Ketone 11/04/2022 Negative  NEG mg/dL Final    Bilirubin 11/04/2022 Negative  NEG   Final    Blood 11/04/2022 Negative  NEG   Final    Urobilinogen 11/04/2022 1.0  0.2 - 1.0 EU/dL Final    Nitrites 11/04/2022 Negative  NEG   Final    Leukocyte Esterase 11/04/2022 Negative  NEG   Final    UA:UC IF INDICATED 11/04/2022 CULTURE NOT INDICATED BY UA RESULT  CNI   Final    WBC 11/04/2022 0-4  0 - 4 /hpf Final    RBC 11/04/2022 0-5  0 - 5 /hpf Final    Epithelial cells 11/04/2022 FEW  FEW /lpf Final    Epithelial cell category consists of squamous cells and /or transitional urothelial cells. Renal tubular cells, if present, are separately identified as such.     Bacteria 11/04/2022 Negative  NEG /hpf Final    Hyaline cast 11/04/2022 0-2  0 - 5 /lpf Final    Ventricular Rate 11/04/2022 48  BPM Final    Atrial Rate 11/04/2022 48  BPM Final    P-R Interval 11/04/2022 152  ms Final    QRS Duration 11/04/2022 100  ms Final    Q-T Interval 11/04/2022 468  ms Final    QTC Calculation (Bezet) 11/04/2022 418  ms Final    Calculated P Axis 11/04/2022 69  degrees Final    Calculated R Axis 11/04/2022 77  degrees Final    Calculated T Axis 11/04/2022 73 degrees Final    Diagnosis 11/04/2022    Final                    Value:Sinus bradycardia  Otherwise normal ECG  When compared with ECG of 16-SEP-2020 15:56,  QT has shortened  Confirmed by Gabriella Paul MD ([id number]) on 11/4/2022 8:33:36 PM      Hemoglobin A1c 11/04/2022 5.3  4.0 - 5.6 % Final    Comment: NEW METHOD  PLEASE NOTE NEW REFERENCE RANGE  (NOTE)  HbA1C Interpretive Ranges  <5.7              Normal  5.7 - 6.4         Consider Prediabetes  >6.5              Consider Diabetes      Est. average glucose 11/04/2022 105  mg/dL Final    Special Requests: 11/04/2022 NO SPECIAL REQUESTS    Final    Culture result: 11/04/2022 MRSA NOT PRESENT    Final    Culture result: 11/04/2022     Final                    Value:Screening of patient nares for MRSA is for surveillance purposes and, if positive, to facilitate isolation considerations in high risk settings. It is not intended for automatic decolonization interventions per se as regimens are not sufficiently effective to warrant routine use. Skin:     Denies open wounds, cuts, sores, rashes or other areas of concern in PAT assessment.           Vic Murillo NP  Available via [address] [place] [address]

## 2022-11-09 ENCOUNTER — ANESTHESIA EVENT (OUTPATIENT)
Dept: SURGERY | Age: 52
End: 2022-11-09
Payer: COMMERCIAL

## 2022-11-09 DIAGNOSIS — M17.12 PRIMARY OSTEOARTHRITIS OF LEFT KNEE: Primary | ICD-10-CM

## 2022-11-10 ENCOUNTER — HOSPITAL ENCOUNTER (OUTPATIENT)
Age: 52
Setting detail: OBSERVATION
Discharge: HOME HEALTH | End: 2022-11-11
Attending: ORTHOPAEDIC SURGERY | Admitting: ORTHOPAEDIC SURGERY
Payer: COMMERCIAL

## 2022-11-10 ENCOUNTER — ANESTHESIA (OUTPATIENT)
Dept: SURGERY | Age: 52
End: 2022-11-10
Payer: COMMERCIAL

## 2022-11-10 VITALS — SYSTOLIC BLOOD PRESSURE: 121 MMHG | DIASTOLIC BLOOD PRESSURE: 82 MMHG | OXYGEN SATURATION: 100 % | HEART RATE: 46 BPM

## 2022-11-10 DIAGNOSIS — M17.12 LOCALIZED OSTEOARTHRITIS OF LEFT KNEE: Primary | ICD-10-CM

## 2022-11-10 DIAGNOSIS — M17.12 PRIMARY LOCALIZED OSTEOARTHRITIS OF LEFT KNEE: ICD-10-CM

## 2022-11-10 LAB
GLUCOSE BLD STRIP.AUTO-MCNC: 83 MG/DL (ref 65–117)
INR PPP: 1.1 (ref 0.9–1.1)
PROTHROMBIN TIME: 11.4 SEC (ref 9–11.1)
SERVICE CMNT-IMP: NORMAL

## 2022-11-10 PROCEDURE — 77030036722: Performed by: ORTHOPAEDIC SURGERY

## 2022-11-10 PROCEDURE — 77030040922 HC BLNKT HYPOTHRM STRY -A

## 2022-11-10 PROCEDURE — G0378 HOSPITAL OBSERVATION PER HR: HCPCS

## 2022-11-10 PROCEDURE — 74011250636 HC RX REV CODE- 250/636: Performed by: ORTHOPAEDIC SURGERY

## 2022-11-10 PROCEDURE — 76210000016 HC OR PH I REC 1 TO 1.5 HR: Performed by: ORTHOPAEDIC SURGERY

## 2022-11-10 PROCEDURE — 77030007866 HC KT SPN ANES BBMI -B: Performed by: STUDENT IN AN ORGANIZED HEALTH CARE EDUCATION/TRAINING PROGRAM

## 2022-11-10 PROCEDURE — C1776 JOINT DEVICE (IMPLANTABLE): HCPCS | Performed by: ORTHOPAEDIC SURGERY

## 2022-11-10 PROCEDURE — 97530 THERAPEUTIC ACTIVITIES: CPT

## 2022-11-10 PROCEDURE — 74011250637 HC RX REV CODE- 250/637: Performed by: ORTHOPAEDIC SURGERY

## 2022-11-10 PROCEDURE — C1713 ANCHOR/SCREW BN/BN,TIS/BN: HCPCS | Performed by: ORTHOPAEDIC SURGERY

## 2022-11-10 PROCEDURE — 77030035236 HC SUT PDS STRATFX BARB J&J -B: Performed by: ORTHOPAEDIC SURGERY

## 2022-11-10 PROCEDURE — 74011250637 HC RX REV CODE- 250/637: Performed by: STUDENT IN AN ORGANIZED HEALTH CARE EDUCATION/TRAINING PROGRAM

## 2022-11-10 PROCEDURE — 74011250636 HC RX REV CODE- 250/636: Performed by: NURSE ANESTHETIST, CERTIFIED REGISTERED

## 2022-11-10 PROCEDURE — 65270000029 HC RM PRIVATE

## 2022-11-10 PROCEDURE — 74011250636 HC RX REV CODE- 250/636: Performed by: STUDENT IN AN ORGANIZED HEALTH CARE EDUCATION/TRAINING PROGRAM

## 2022-11-10 PROCEDURE — 77030006835 HC BLD SAW SAG STRY -B: Performed by: ORTHOPAEDIC SURGERY

## 2022-11-10 PROCEDURE — 76010000174 HC OR TIME 3.5 TO 4 HR INTENSV-TIER 1: Performed by: ORTHOPAEDIC SURGERY

## 2022-11-10 PROCEDURE — 77030002933 HC SUT MCRYL J&J -A: Performed by: ORTHOPAEDIC SURGERY

## 2022-11-10 PROCEDURE — 77030036638 HC ACC KT GPS KNE V2 EXAC -D: Performed by: ORTHOPAEDIC SURGERY

## 2022-11-10 PROCEDURE — 74011000250 HC RX REV CODE- 250: Performed by: STUDENT IN AN ORGANIZED HEALTH CARE EDUCATION/TRAINING PROGRAM

## 2022-11-10 PROCEDURE — C9290 INJ, BUPIVACAINE LIPOSOME: HCPCS | Performed by: ORTHOPAEDIC SURGERY

## 2022-11-10 PROCEDURE — 97116 GAIT TRAINING THERAPY: CPT

## 2022-11-10 PROCEDURE — 74011000250 HC RX REV CODE- 250: Performed by: ORTHOPAEDIC SURGERY

## 2022-11-10 PROCEDURE — 85610 PROTHROMBIN TIME: CPT

## 2022-11-10 PROCEDURE — 97161 PT EVAL LOW COMPLEX 20 MIN: CPT

## 2022-11-10 PROCEDURE — 64447 NJX AA&/STRD FEMORAL NRV IMG: CPT | Performed by: STUDENT IN AN ORGANIZED HEALTH CARE EDUCATION/TRAINING PROGRAM

## 2022-11-10 PROCEDURE — 74011000258 HC RX REV CODE- 258: Performed by: ORTHOPAEDIC SURGERY

## 2022-11-10 PROCEDURE — 77030039497 HC CST PAD STERILE CHCS -A: Performed by: ORTHOPAEDIC SURGERY

## 2022-11-10 PROCEDURE — 74011000250 HC RX REV CODE- 250: Performed by: NURSE ANESTHETIST, CERTIFIED REGISTERED

## 2022-11-10 PROCEDURE — 2709999900 HC NON-CHARGEABLE SUPPLY: Performed by: ORTHOPAEDIC SURGERY

## 2022-11-10 PROCEDURE — 82962 GLUCOSE BLOOD TEST: CPT

## 2022-11-10 PROCEDURE — 77030031139 HC SUT VCRL2 J&J -A: Performed by: ORTHOPAEDIC SURGERY

## 2022-11-10 PROCEDURE — 36415 COLL VENOUS BLD VENIPUNCTURE: CPT

## 2022-11-10 PROCEDURE — 76060000038 HC ANESTHESIA 3.5 TO 4 HR: Performed by: ORTHOPAEDIC SURGERY

## 2022-11-10 PROCEDURE — 77030003601 HC NDL NRV BLK BBMI -A

## 2022-11-10 DEVICE — CEMENT BNE 80 GM SYS GENTA CEMEX: Type: IMPLANTABLE DEVICE | Site: KNEE | Status: FUNCTIONAL

## 2022-11-10 DEVICE — IMPLANTABLE DEVICE
Type: IMPLANTABLE DEVICE | Site: KNEE | Status: FUNCTIONAL
Brand: TRULIANT

## 2022-11-10 DEVICE — IMPLANTABLE DEVICE
Type: IMPLANTABLE DEVICE | Site: KNEE | Status: FUNCTIONAL
Brand: OPTETRAK

## 2022-11-10 DEVICE — IMPLANTABLE DEVICE
Type: IMPLANTABLE DEVICE | Site: KNEE | Status: FUNCTIONAL
Brand: OPTETRAK LOGIC

## 2022-11-10 RX ORDER — HYDROMORPHONE HYDROCHLORIDE 2 MG/1
4 TABLET ORAL
Qty: 40 TABLET | Refills: 0 | OUTPATIENT
Start: 2022-11-10 | End: 2022-11-11

## 2022-11-10 RX ORDER — HYDROMORPHONE HYDROCHLORIDE 2 MG/1
4 TABLET ORAL
Status: DISCONTINUED | OUTPATIENT
Start: 2022-11-10 | End: 2022-11-11 | Stop reason: HOSPADM

## 2022-11-10 RX ORDER — MORPHINE SULFATE 2 MG/ML
2 INJECTION, SOLUTION INTRAMUSCULAR; INTRAVENOUS
Status: DISCONTINUED | OUTPATIENT
Start: 2022-11-10 | End: 2022-11-10 | Stop reason: HOSPADM

## 2022-11-10 RX ORDER — CEFAZOLIN SODIUM 1 G/3ML
INJECTION, POWDER, FOR SOLUTION INTRAMUSCULAR; INTRAVENOUS AS NEEDED
Status: DISCONTINUED | OUTPATIENT
Start: 2022-11-10 | End: 2022-11-10

## 2022-11-10 RX ORDER — HYDROMORPHONE HYDROCHLORIDE 1 MG/ML
0.5 INJECTION, SOLUTION INTRAMUSCULAR; INTRAVENOUS; SUBCUTANEOUS
Status: DISCONTINUED | OUTPATIENT
Start: 2022-11-10 | End: 2022-11-11 | Stop reason: HOSPADM

## 2022-11-10 RX ORDER — NALOXONE HYDROCHLORIDE 0.4 MG/ML
0.4 INJECTION, SOLUTION INTRAMUSCULAR; INTRAVENOUS; SUBCUTANEOUS AS NEEDED
Status: DISCONTINUED | OUTPATIENT
Start: 2022-11-10 | End: 2022-11-11 | Stop reason: HOSPADM

## 2022-11-10 RX ORDER — EPHEDRINE SULFATE/0.9% NACL/PF 50 MG/5 ML
SYRINGE (ML) INTRAVENOUS AS NEEDED
Status: DISCONTINUED | OUTPATIENT
Start: 2022-11-10 | End: 2022-11-10 | Stop reason: HOSPADM

## 2022-11-10 RX ORDER — AMOXICILLIN 250 MG
1 CAPSULE ORAL 2 TIMES DAILY
Status: DISCONTINUED | OUTPATIENT
Start: 2022-11-10 | End: 2022-11-11 | Stop reason: HOSPADM

## 2022-11-10 RX ORDER — FAMOTIDINE 20 MG/1
20 TABLET, FILM COATED ORAL 2 TIMES DAILY
Status: DISCONTINUED | OUTPATIENT
Start: 2022-11-10 | End: 2022-11-11 | Stop reason: HOSPADM

## 2022-11-10 RX ORDER — SODIUM CHLORIDE 0.9 % (FLUSH) 0.9 %
5-40 SYRINGE (ML) INJECTION EVERY 8 HOURS
Status: DISCONTINUED | OUTPATIENT
Start: 2022-11-10 | End: 2022-11-10 | Stop reason: HOSPADM

## 2022-11-10 RX ORDER — BUPROPION HYDROCHLORIDE 150 MG/1
150 TABLET, EXTENDED RELEASE ORAL 2 TIMES DAILY
Status: DISCONTINUED | OUTPATIENT
Start: 2022-11-10 | End: 2022-11-11 | Stop reason: HOSPADM

## 2022-11-10 RX ORDER — HYDROXYZINE HYDROCHLORIDE 10 MG/1
10 TABLET, FILM COATED ORAL
Status: DISCONTINUED | OUTPATIENT
Start: 2022-11-10 | End: 2022-11-11 | Stop reason: HOSPADM

## 2022-11-10 RX ORDER — DEXTROAMPHETAMINE SACCHARATE, AMPHETAMINE ASPARTATE, DEXTROAMPHETAMINE SULFATE AND AMPHETAMINE SULFATE 2.5; 2.5; 2.5; 2.5 MG/1; MG/1; MG/1; MG/1
20 TABLET ORAL 2 TIMES DAILY
Status: DISCONTINUED | OUTPATIENT
Start: 2022-11-10 | End: 2022-11-11 | Stop reason: HOSPADM

## 2022-11-10 RX ORDER — LEVOTHYROXINE SODIUM 50 UG/1
50 TABLET ORAL
Status: DISCONTINUED | OUTPATIENT
Start: 2022-11-11 | End: 2022-11-11 | Stop reason: HOSPADM

## 2022-11-10 RX ORDER — ONDANSETRON 2 MG/ML
4 INJECTION INTRAMUSCULAR; INTRAVENOUS
Status: DISCONTINUED | OUTPATIENT
Start: 2022-11-10 | End: 2022-11-11 | Stop reason: HOSPADM

## 2022-11-10 RX ORDER — FACIAL-BODY WIPES
10 EACH TOPICAL DAILY PRN
Status: DISCONTINUED | OUTPATIENT
Start: 2022-11-12 | End: 2022-11-11 | Stop reason: HOSPADM

## 2022-11-10 RX ORDER — SODIUM CHLORIDE 0.9 % (FLUSH) 0.9 %
5-40 SYRINGE (ML) INJECTION AS NEEDED
Status: DISCONTINUED | OUTPATIENT
Start: 2022-11-10 | End: 2022-11-10 | Stop reason: HOSPADM

## 2022-11-10 RX ORDER — ROPIVACAINE HYDROCHLORIDE 5 MG/ML
INJECTION, SOLUTION EPIDURAL; INFILTRATION; PERINEURAL
Status: COMPLETED | OUTPATIENT
Start: 2022-11-10 | End: 2022-11-10

## 2022-11-10 RX ORDER — PROPRANOLOL HYDROCHLORIDE 60 MG/1
60 CAPSULE, EXTENDED RELEASE ORAL
Status: DISCONTINUED | OUTPATIENT
Start: 2022-11-10 | End: 2022-11-11 | Stop reason: HOSPADM

## 2022-11-10 RX ORDER — ACETAMINOPHEN 325 MG/1
650 TABLET ORAL
Status: DISCONTINUED | OUTPATIENT
Start: 2022-11-10 | End: 2022-11-11 | Stop reason: HOSPADM

## 2022-11-10 RX ORDER — MIDAZOLAM HYDROCHLORIDE 1 MG/ML
1 INJECTION, SOLUTION INTRAMUSCULAR; INTRAVENOUS AS NEEDED
Status: DISCONTINUED | OUTPATIENT
Start: 2022-11-10 | End: 2022-11-10 | Stop reason: HOSPADM

## 2022-11-10 RX ORDER — SODIUM CHLORIDE 0.9 % (FLUSH) 0.9 %
5-40 SYRINGE (ML) INJECTION EVERY 8 HOURS
Status: DISCONTINUED | OUTPATIENT
Start: 2022-11-10 | End: 2022-11-11 | Stop reason: HOSPADM

## 2022-11-10 RX ORDER — WARFARIN 2.5 MG/1
2.5 TABLET ORAL DAILY
Qty: 60 TABLET | Refills: 3 | Status: SHIPPED | OUTPATIENT
Start: 2022-11-10

## 2022-11-10 RX ORDER — TRANEXAMIC ACID 100 MG/ML
INJECTION, SOLUTION INTRAVENOUS AS NEEDED
Status: DISCONTINUED | OUTPATIENT
Start: 2022-11-10 | End: 2022-11-10 | Stop reason: HOSPADM

## 2022-11-10 RX ORDER — SODIUM CHLORIDE, SODIUM LACTATE, POTASSIUM CHLORIDE, CALCIUM CHLORIDE 600; 310; 30; 20 MG/100ML; MG/100ML; MG/100ML; MG/100ML
1000 INJECTION, SOLUTION INTRAVENOUS CONTINUOUS
Status: DISCONTINUED | OUTPATIENT
Start: 2022-11-10 | End: 2022-11-10 | Stop reason: HOSPADM

## 2022-11-10 RX ORDER — HYDROMORPHONE HYDROCHLORIDE 2 MG/1
2 TABLET ORAL
Status: DISCONTINUED | OUTPATIENT
Start: 2022-11-10 | End: 2022-11-11 | Stop reason: HOSPADM

## 2022-11-10 RX ORDER — KETOROLAC TROMETHAMINE 30 MG/ML
15 INJECTION, SOLUTION INTRAMUSCULAR; INTRAVENOUS EVERY 6 HOURS
Status: COMPLETED | OUTPATIENT
Start: 2022-11-10 | End: 2022-11-11

## 2022-11-10 RX ORDER — SODIUM CHLORIDE 0.9 % (FLUSH) 0.9 %
5-40 SYRINGE (ML) INJECTION AS NEEDED
Status: DISCONTINUED | OUTPATIENT
Start: 2022-11-10 | End: 2022-11-11 | Stop reason: HOSPADM

## 2022-11-10 RX ORDER — SODIUM CHLORIDE 9 MG/ML
125 INJECTION, SOLUTION INTRAVENOUS CONTINUOUS
Status: DISCONTINUED | OUTPATIENT
Start: 2022-11-10 | End: 2022-11-11 | Stop reason: HOSPADM

## 2022-11-10 RX ORDER — ACETAMINOPHEN 325 MG/1
650 TABLET ORAL ONCE
Status: COMPLETED | OUTPATIENT
Start: 2022-11-10 | End: 2022-11-10

## 2022-11-10 RX ORDER — POLYETHYLENE GLYCOL 3350 17 G/17G
17 POWDER, FOR SOLUTION ORAL DAILY
Status: DISCONTINUED | OUTPATIENT
Start: 2022-11-11 | End: 2022-11-11 | Stop reason: HOSPADM

## 2022-11-10 RX ORDER — LANOLIN ALCOHOL/MO/W.PET/CERES
400 CREAM (GRAM) TOPICAL DAILY
Status: DISCONTINUED | OUTPATIENT
Start: 2022-11-11 | End: 2022-11-11 | Stop reason: HOSPADM

## 2022-11-10 RX ORDER — PROPOFOL 10 MG/ML
INJECTION, EMULSION INTRAVENOUS
Status: DISCONTINUED | OUTPATIENT
Start: 2022-11-10 | End: 2022-11-10 | Stop reason: HOSPADM

## 2022-11-10 RX ORDER — LIOTHYRONINE SODIUM 5 UG/1
5 TABLET ORAL
Status: DISCONTINUED | OUTPATIENT
Start: 2022-11-11 | End: 2022-11-11 | Stop reason: HOSPADM

## 2022-11-10 RX ORDER — ROPIVACAINE HYDROCHLORIDE 5 MG/ML
30 INJECTION, SOLUTION EPIDURAL; INFILTRATION; PERINEURAL AS NEEDED
Status: DISCONTINUED | OUTPATIENT
Start: 2022-11-10 | End: 2022-11-10 | Stop reason: HOSPADM

## 2022-11-10 RX ORDER — BACITRACIN ZINC 500 UNIT/G
OINTMENT (GRAM) TOPICAL AS NEEDED
Status: DISCONTINUED | OUTPATIENT
Start: 2022-11-10 | End: 2022-11-10 | Stop reason: HOSPADM

## 2022-11-10 RX ORDER — FENTANYL CITRATE 50 UG/ML
50 INJECTION, SOLUTION INTRAMUSCULAR; INTRAVENOUS AS NEEDED
Status: DISCONTINUED | OUTPATIENT
Start: 2022-11-10 | End: 2022-11-10 | Stop reason: HOSPADM

## 2022-11-10 RX ORDER — SODIUM CHLORIDE 9 MG/ML
INJECTION, SOLUTION INTRAVENOUS
Status: DISCONTINUED | OUTPATIENT
Start: 2022-11-10 | End: 2022-11-10 | Stop reason: HOSPADM

## 2022-11-10 RX ORDER — GLYCOPYRROLATE 0.2 MG/ML
INJECTION INTRAMUSCULAR; INTRAVENOUS AS NEEDED
Status: DISCONTINUED | OUTPATIENT
Start: 2022-11-10 | End: 2022-11-10 | Stop reason: HOSPADM

## 2022-11-10 RX ORDER — FENTANYL CITRATE 50 UG/ML
25 INJECTION, SOLUTION INTRAMUSCULAR; INTRAVENOUS
Status: DISCONTINUED | OUTPATIENT
Start: 2022-11-10 | End: 2022-11-10 | Stop reason: HOSPADM

## 2022-11-10 RX ORDER — WARFARIN SODIUM 5 MG/1
5 TABLET ORAL ONCE
Status: COMPLETED | OUTPATIENT
Start: 2022-11-10 | End: 2022-11-10

## 2022-11-10 RX ORDER — ONDANSETRON 2 MG/ML
4 INJECTION INTRAMUSCULAR; INTRAVENOUS AS NEEDED
Status: DISCONTINUED | OUTPATIENT
Start: 2022-11-10 | End: 2022-11-10 | Stop reason: HOSPADM

## 2022-11-10 RX ORDER — TRAZODONE HYDROCHLORIDE 100 MG/1
50 TABLET ORAL
Status: DISCONTINUED | OUTPATIENT
Start: 2022-11-10 | End: 2022-11-11 | Stop reason: HOSPADM

## 2022-11-10 RX ORDER — LIDOCAINE HYDROCHLORIDE 10 MG/ML
0.1 INJECTION, SOLUTION EPIDURAL; INFILTRATION; INTRACAUDAL; PERINEURAL AS NEEDED
Status: DISCONTINUED | OUTPATIENT
Start: 2022-11-10 | End: 2022-11-10 | Stop reason: HOSPADM

## 2022-11-10 RX ORDER — MIDAZOLAM HYDROCHLORIDE 1 MG/ML
INJECTION, SOLUTION INTRAMUSCULAR; INTRAVENOUS AS NEEDED
Status: DISCONTINUED | OUTPATIENT
Start: 2022-11-10 | End: 2022-11-10 | Stop reason: HOSPADM

## 2022-11-10 RX ORDER — BUPIVACAINE HYDROCHLORIDE 5 MG/ML
INJECTION, SOLUTION EPIDURAL; INTRACAUDAL
Status: COMPLETED | OUTPATIENT
Start: 2022-11-10 | End: 2022-11-10

## 2022-11-10 RX ORDER — HYDROMORPHONE HYDROCHLORIDE 1 MG/ML
0.2 INJECTION, SOLUTION INTRAMUSCULAR; INTRAVENOUS; SUBCUTANEOUS
Status: DISCONTINUED | OUTPATIENT
Start: 2022-11-10 | End: 2022-11-10 | Stop reason: HOSPADM

## 2022-11-10 RX ADMIN — KETOROLAC TROMETHAMINE 15 MG: 30 INJECTION, SOLUTION INTRAMUSCULAR; INTRAVENOUS at 18:38

## 2022-11-10 RX ADMIN — SODIUM CHLORIDE, PRESERVATIVE FREE 10 ML: 5 INJECTION INTRAVENOUS at 14:16

## 2022-11-10 RX ADMIN — HYDROMORPHONE HYDROCHLORIDE 0.5 MG: 1 INJECTION, SOLUTION INTRAMUSCULAR; INTRAVENOUS; SUBCUTANEOUS at 15:59

## 2022-11-10 RX ADMIN — ROPIVACAINE HYDROCHLORIDE 30 ML: 5 INJECTION, SOLUTION EPIDURAL; INFILTRATION; PERINEURAL at 07:15

## 2022-11-10 RX ADMIN — FENTANYL CITRATE 100 MCG: 50 INJECTION, SOLUTION INTRAMUSCULAR; INTRAVENOUS at 07:12

## 2022-11-10 RX ADMIN — Medication 10 MG: at 10:15

## 2022-11-10 RX ADMIN — MIDAZOLAM HYDROCHLORIDE 2 MG: 1 INJECTION, SOLUTION INTRAMUSCULAR; INTRAVENOUS at 07:12

## 2022-11-10 RX ADMIN — PROPOFOL 20 MG: 10 INJECTION, EMULSION INTRAVENOUS at 07:40

## 2022-11-10 RX ADMIN — PROPOFOL 50 MCG/KG/MIN: 10 INJECTION, EMULSION INTRAVENOUS at 07:41

## 2022-11-10 RX ADMIN — BUPIVACAINE HYDROCHLORIDE 12.5 MG: 5 INJECTION, SOLUTION EPIDURAL; INTRACAUDAL; PERINEURAL at 07:37

## 2022-11-10 RX ADMIN — PROPOFOL 65 MCG/KG/MIN: 10 INJECTION, EMULSION INTRAVENOUS at 10:00

## 2022-11-10 RX ADMIN — Medication 10 MG: at 08:38

## 2022-11-10 RX ADMIN — SODIUM CHLORIDE, POTASSIUM CHLORIDE, SODIUM LACTATE AND CALCIUM CHLORIDE 1000 ML: 600; 310; 30; 20 INJECTION, SOLUTION INTRAVENOUS at 06:40

## 2022-11-10 RX ADMIN — SODIUM CHLORIDE, PRESERVATIVE FREE 10 ML: 5 INJECTION INTRAVENOUS at 23:48

## 2022-11-10 RX ADMIN — GLYCOPYRROLATE 0.2 MG: 0.2 INJECTION INTRAMUSCULAR; INTRAVENOUS at 07:32

## 2022-11-10 RX ADMIN — MIDAZOLAM 2 MG: 1 INJECTION INTRAMUSCULAR; INTRAVENOUS at 07:27

## 2022-11-10 RX ADMIN — FENTANYL CITRATE 25 MCG: 50 INJECTION INTRAMUSCULAR; INTRAVENOUS at 11:35

## 2022-11-10 RX ADMIN — WATER 2 G: 1 INJECTION INTRAMUSCULAR; INTRAVENOUS; SUBCUTANEOUS at 07:45

## 2022-11-10 RX ADMIN — HYDROMORPHONE HYDROCHLORIDE 4 MG: 2 TABLET ORAL at 23:46

## 2022-11-10 RX ADMIN — WATER 2 G: 1 INJECTION INTRAMUSCULAR; INTRAVENOUS; SUBCUTANEOUS at 23:48

## 2022-11-10 RX ADMIN — PROPOFOL 40 MG: 10 INJECTION, EMULSION INTRAVENOUS at 07:39

## 2022-11-10 RX ADMIN — SENNOSIDES AND DOCUSATE SODIUM 1 TABLET: 50; 8.6 TABLET ORAL at 18:39

## 2022-11-10 RX ADMIN — WARFARIN SODIUM 5 MG: 5 TABLET ORAL at 15:48

## 2022-11-10 RX ADMIN — GLYCOPYRROLATE 0.2 MG: 0.2 INJECTION INTRAMUSCULAR; INTRAVENOUS at 07:34

## 2022-11-10 RX ADMIN — HYDROMORPHONE HYDROCHLORIDE 4 MG: 2 TABLET ORAL at 19:29

## 2022-11-10 RX ADMIN — DEXTROAMPHETAMINE SACCHARATE, AMPHETAMINE ASPARTATE, DEXTROAMPHETAMINE SULFATE, AMPHETAMINE SULFATE TABLETS, 10 MG,CLL 20 MG: 2.5; 2.5; 2.5; 2.5 TABLET ORAL at 18:39

## 2022-11-10 RX ADMIN — SODIUM CHLORIDE 125 ML/HR: 9 INJECTION, SOLUTION INTRAVENOUS at 11:30

## 2022-11-10 RX ADMIN — ACETAMINOPHEN 650 MG: 325 TABLET ORAL at 18:38

## 2022-11-10 RX ADMIN — BUPROPION HYDROCHLORIDE 150 MG: 150 TABLET, EXTENDED RELEASE ORAL at 18:39

## 2022-11-10 RX ADMIN — WATER 2 G: 1 INJECTION INTRAMUSCULAR; INTRAVENOUS; SUBCUTANEOUS at 15:48

## 2022-11-10 RX ADMIN — ACETAMINOPHEN 650 MG: 325 TABLET ORAL at 06:28

## 2022-11-10 RX ADMIN — FAMOTIDINE 20 MG: 20 TABLET, FILM COATED ORAL at 18:00

## 2022-11-10 RX ADMIN — PROPRANOLOL HYDROCHLORIDE 60 MG: 60 CAPSULE, EXTENDED RELEASE ORAL at 23:46

## 2022-11-10 RX ADMIN — Medication 10 MG: at 09:12

## 2022-11-10 RX ADMIN — HYDROMORPHONE HYDROCHLORIDE 4 MG: 2 TABLET ORAL at 15:47

## 2022-11-10 RX ADMIN — Medication 10 MG: at 08:12

## 2022-11-10 RX ADMIN — KETOROLAC TROMETHAMINE 15 MG: 30 INJECTION, SOLUTION INTRAMUSCULAR; INTRAVENOUS at 23:47

## 2022-11-10 RX ADMIN — SODIUM CHLORIDE: 900 INJECTION, SOLUTION INTRAVENOUS at 10:00

## 2022-11-10 RX ADMIN — KETOROLAC TROMETHAMINE 15 MG: 30 INJECTION, SOLUTION INTRAMUSCULAR; INTRAVENOUS at 14:16

## 2022-11-10 NOTE — ANESTHESIA POSTPROCEDURE EVALUATION
Procedure(s):  LEFT KNEE REVISION FROM UNI TO LEFT TOTAL KNEE ARTHROPLASTY. spinal, regional    Anesthesia Post Evaluation      Multimodal analgesia: multimodal analgesia used between 6 hours prior to anesthesia start to PACU discharge  Patient location during evaluation: PACU  Level of consciousness: awake  Pain management: adequate  Airway patency: patent  Anesthetic complications: no  Cardiovascular status: acceptable  Respiratory status: acceptable  Hydration status: acceptable  Post anesthesia nausea and vomiting:  none  Final Post Anesthesia Temperature Assessment:  Normothermia (36.0-37.5 degrees C)      INITIAL Post-op Vital signs:   Vitals Value Taken Time   /76 11/10/22 1145   Temp     Pulse 46 11/10/22 1146   Resp 12 11/10/22 1146   SpO2 100 % 11/10/22 1146   Vitals shown include unvalidated device data.

## 2022-11-10 NOTE — ANESTHESIA PROCEDURE NOTES
Peripheral Block    Start time: 11/10/2022 7:10 AM  End time: 11/10/2022 7:15 AM  Performed by: Jocelyn Moore MD  Authorized by: Jocelyn Moore MD       Pre-procedure: Indications: at surgeon's request and post-op pain management    Preanesthetic Checklist: patient identified, risks and benefits discussed, site marked, timeout performed and patient being monitored    Timeout Time: 07:15 EST      Block Type:   Block Type:   Adductor canal  Laterality:  Left  Monitoring:  Standard ASA monitoring, continuous pulse ox, frequent vital sign checks, heart rate, responsive to questions and oxygen  Injection Technique:  Single shot  Procedures: ultrasound guided    Patient Position: supine  Prep: chlorhexidine    Location:  Mid thigh  Needle Type:  Stimuplex  Needle Gauge:  21 G  Needle Localization:  Ultrasound guidance and anatomical landmarks  Medication Injected:  Ropivacaine (PF) (NAROPIN)(0.5%) 5 mg/mL injection - Peripheral Nerve Block   30 mL - 11/10/2022 7:15:00 AM  Med Admin Time: 11/10/2022 7:15 AM    Assessment:  Number of attempts:  1  Injection Assessment:  Incremental injection every 5 mL, local visualized surrounding nerve on ultrasound, negative aspiration for blood, no paresthesia, no intravascular symptoms and ultrasound image on chart  Patient tolerance:  Patient tolerated the procedure well with no immediate complications

## 2022-11-10 NOTE — ANESTHESIA PREPROCEDURE EVALUATION
Relevant Problems   No relevant active problems       Anesthetic History   No history of anesthetic complications            Review of Systems / Medical History  Patient summary reviewed, nursing notes reviewed and pertinent labs reviewed    Pulmonary  Within defined limits                 Neuro/Psych   Within defined limits           Cardiovascular    Hypertension              Exercise tolerance: >4 METS     GI/Hepatic/Renal     GERD           Endo/Other      Hypothyroidism  Arthritis     Other Findings            Physical Exam    Airway  Mallampati: II  TM Distance: 4 - 6 cm  Neck ROM: normal range of motion   Mouth opening: Normal     Cardiovascular    Rhythm: regular  Rate: normal         Dental  No notable dental hx       Pulmonary  Breath sounds clear to auscultation               Abdominal  GI exam deferred       Other Findings            Anesthetic Plan    ASA: 2  Anesthesia type: spinal and regional - saphenous block          Induction: Intravenous  Anesthetic plan and risks discussed with: Patient

## 2022-11-10 NOTE — OP NOTES
[address]   OPERATIVE REPORT    Name:  Sherin Contreras  MR#:  [id number]  :  1970  ACCOUNT #:  [de-identified]  DATE OF SERVICE:  11/10/2022    PREOPERATIVE DIAGNOSIS:  Secondary osteoarthritis of left knee with retained hardware, left knee secondary to prior lateral compartment partial knee replacement. POSTOPERATIVE DIAGNOSIS:  Secondary osteoarthritis of left knee with retained hardware, left knee secondary to prior lateral compartment partial knee replacement. PROCEDURE PERFORMED:  1. Left knee removal of prior partial knee replacement, left knee. 2.  Conversion to total knee replacement, left knee. SURGEON:  Kevin Gill MD    FIRST ASSISTANT:  ASHLEIGH Marin    SECOND ASSISTANT:  Viri Helms DO    ANESTHESIA:  Spinal with a MAC and 266 mg of Exparel. COMPLICATIONS:  Zero. SPECIMENS REMOVED:  1. Bone discarded. 2.  Prior partial knee replacement, left knee. IMPLANTS:  This was an Exactech Truliant knee, size 5 femur, size 5 tibia with an 11-mm CRC poly and a 38-mm patella. All components cemented. ESTIMATED BLOOD LOSS:  Less than 200 mL. INTRAVENOUS FLUIDS:  Crystalloids given. PREOPERATIVE MEDICINE:  Ancef 2 g. INDICATIONS:  The patient is a 55-year-old who I have taken care in the office for quite sometime. We have had an extended history. He was originally seen by one of my partners approximately 20+ years ago and underwent a lateral femoral osteochondral implant. He did relatively well and then unfortunately began having recurrent pain and effusion. This led to a subsequent partial knee replacement of the lateral compartment. The lateral partial knee replacement did provide him comfort for quite sometime. However, over the past 2-3 years, he has had increasing swelling and pain. We did arthroscope his knee, and there was evidence of some polyethylene wear that was along the midportion of the poly.   By the way, this was an All-Poly tibial component. We went along with recurrent aspiration and injections. He is a , so he is very active and stands on his feet all day long. It finally got to the point where his recurrent swelling was more persistent. At that time, we had a heavier conversation in regard to converting this to a total knee replacement. Realizing that he is only 49years of age, there was really no other option for him secondary to these recurrent effusions. He is obviously knowledgeable about anatomy and such. We had a long conversation in regard to surgery, the technique, the metal and plastic that would be utilized. He also understands there is a longevity on total knees and quite possibly may need revision total knee replacement at some point in time. I talked to him about risks such as recurrent knee pain, recurrent effusion, thigh pain, bleeding, numbness and tingling, loss of range of motion, infection, DVT, PE, MI, CVA and other unforeseen events could occur in a perioperative fashion. He also understood a significant amount of physical therapy would be required. Understanding all the risks and benefits as mentioned above, he wished to proceed, signed consent, was taken to surgery. PROCEDURE:  The patient was taken back to surgery, placed supine on the operative table after successful spinal anesthetic. He was placed in supine position with all bony prominences protected. Tourniquet was applied on the left upper thigh. The left lower extremity was sterilely prepped and draped in the orthopedic fashion. The patient received 2 g of Ancef. Time-out was performed and all parties agreed the left knee was the correct knee. Following the time-out and a sterile prep and drape, an Esmarch was used to exsanguinate the extremity. Tourniquet was inflated to 275 mmHg. There was a prior anterior midline incision which was utilized once again.   A 10 blade was used to dissect down through the dermis and a blunt dissection was taken down to the extensor mechanism. A standard medial parapatellar arthrotomy was performed. As expected, significant scar tissue was noted along the fat pad. Anteromedial dissection was performed along the tibia. The patella was everted and most of the scar tissue in the retropatellar location was debrided. The suprapatellar pouch was debrided of scar tissue and hypertrophic synovium. The extramedullary outriggers were then attached to the distal femur and the proximal tibia for navigation. The ACL was removed and PCL was saved as this was going to be converted to a CR knee. The implant was evaluated. There was a central wear pattern along the poly, but the unit was stable and there were no signs of infection. The patella was everted. We sized this and a freehand resection was performed with a bone saw. This was sized to be 38. Appropriate drill holes were made. We then mapped out all the femoral landmarks, followed by the tibial landmarks, and the computer went through its acquisition. We then adjusted slightly. The femoral jig was then attached onto the femur and the distal cut was adjusted. The cutting block was held in place for the distal cut at that time, we used flexible osteotomes to extricate the lateral femoral component. We were able to do this without violating too much of the normal bone, and we did not have significant bone loss at all. With this removed, we were able to safely convert the distal cut. The bone stock was removed. Rotation was then established. The knee was then placed in hyperflexion and the tibial jig was attached. We then neutralized the cutting block and placed a 5-degree slope. This was checked three times using a G tracker. Happy with this, a small saw blade was used to resect the tibia, taking care to not invade the tibial eminence or the PCL. The bone stock was removed.   We debrided around the PCL again not invading the integrity of the PCL. The slope was appropriate. We then checked our extension gap, and it was around 11. The knee was hyperflexed and a 4-in-1 cutting block for size 5 was inserted. The cuts were then made respectively. The posterior compartments were debrided and Exparel was injected into posterior compartment. Of note, the posterior cut on the lateral side was a relatively fresh cut, so therefore, we did not need augmentation in this scenario. In addition, the tibial cut had a central defect as was expected due to the peg on the All-Poly implant. However, the perimeter of the lateral tibial condyle was firmly intact, so augmenting the tibial side was not needed either. We checked our extension gaps. The flexion gaps were a little tight with flexion as expected, holding the PCL. We placed our size 5 trial in place and a size 9 implant. The knee was brought into extension and brought back up into flexion. Again, there was tightness in flexion, so a 15-blade was used to gently recess the PCL. From here, we were able to build backup and got to a size 11 which seemed to be appropriate matching the flexion and extension. Happy with the trial, the lug nuts were then drilled. We removed all trial components and sized the tibia. The tibia was sized to be a size 5 as well. It was then drilled and broached. Care was taken on the lateral side, and the bone saw was used to lightly create a trough, so as the cruciform punch would not drift off in a different location. The bone stock was then pulsatile irrigated with Irrisept, followed by pulse irrigation. Antibiotic-impregnated cement was mixed in the back table. We took morsels of bone and impacted them into the lateral cavitary bone loss. We then cemented the tibia and impacted the final component of the tibia. We did the same on the femur and placed a 12-mm CRC trial in place for compression. All excess cement was removed.   We then cemented the patella. The rest of the Exparel was injected throughout the anterior compartment. After curing the cement, we manipulated the knee. It was just slightly tight in extension, so we dropped it down to 11. This was the correct size. We had great range of motion and stability. The trial component was removed. We irrigated the knee one last time and the size 11 was inserted. The knee was reduced. The tourniquet was released. Bleeding was controlled with electrocautery. The patient did have patellofemoral lateral disease, so a minor lateral release was performed. This did provide some improvement of tracking of the patella. The synovium was left intact, so as not to create bleeding source from the intra-articular space. The wound was irrigated once again with rest of Irrisept, followed by pulse irrigation. 1 g of TXA topically was then placed over the knee. A #2 Vicryl was used to close the extensor mechanism proximally. Stratafix was used to close the distal portion. A 2-0 Vicryl was placed in subcutaneous tissue and 4-0 Monocryl was placed in the epidermis. Steri-Strips were applied over the incision, followed by Adaptic and bacitracin ointment and a sterile dry dressing. The patient was awakened from general anesthetic in stable condition and transferred to recovery room. Staci Gonzales was first assist during this procedure. She was integral with positioning the patient as well as holding retractor during the case. She also assisted with closure of the wound, application of the dressing and transportation of the patient back to recovery room.       Inderjit Grewal MD      [id number]  D:  11/10/2022 11:04  T:  11/10/2022 11:46  JOB #:  [id number]

## 2022-11-10 NOTE — PROGRESS NOTES
TRANSFER - IN REPORT:    Verbal report received from [address] (name) on Nathan Pugh  being received from PACU  for routine progression of care      Report consisted of patients Situation, Background, Assessment and   Recommendations(SBAR). Information from the following report(s) SBAR, Kardex, Procedure Summary, Intake/Output, MAR, Recent Results, and Cardiac Rhythm SB  , procedure summary was reviewed with the receiving nurse. Opportunity for questions and clarification was provided. Assessment completed upon patients arrival to unit and care assumed.

## 2022-11-10 NOTE — PROGRESS NOTES
Pharmacist Note - Warfarin Dosing  Consult provided for this 49 y.o. male to manage warfarin for DVT ppk    INR Goal: 1.7- 2.2    Therapy Day: 1    Preop Dose: Ferne Kida- none    Drugs that may increase INR: liothyronine  Drugs that may decrease INR: None  Other current anticoagulants/ drugs that may increase bleeding risk: None  Risk factors: None  Daily INR ordered: YES    No results for input(s): HGB, INR, HGBEXT, INREXT in the last 72 hours. Date               INR                 Dose  11/10  ---  5 mg     Assessment/ Plan: Will order warfarin 5 mg PO x 1 dose. Pharmacy will continue to monitor daily and adjust therapy as indicated.      Rut Burton, GómezD

## 2022-11-10 NOTE — BRIEF OP NOTE
Brief Postoperative Note    Patient: Shen Cotton  YOB: 1970  MRN: [id number]    Date of Procedure: 11/10/2022     Pre-Op Diagnosis: OSTEOARTHRITIS LEFT KNEE  Retained lateral unicompartmental knee replacement    Post-Op Diagnosis: Same as preoperative diagnosis. Procedure(s):  LEFT KNEE REVISION FROM UNI TO LEFT TOTAL KNEE ARTHROPLASTY    Surgeon(s): MD Hollis Patten DO    Surgical Assistant: Physician Assistant: Mariana Rojo PA-C    Anesthesia: spinal with MAC    Estimated Blood Loss (mL): 468     Complications: None    Specimens: prior partial knee replacement   Bone discarded    Implants:   Implant Name Type Inv.  Item Serial No.  Lot No. LRB No. Used Action   CEMENT BNE 80 GM SYS GENTA CEMEX - SNA  CEMENT BNE 80 GM SYS GENTA CEMEX NA [employer]WD [id number] Left 1 Implanted   COMPONENT TIB VIK FIT 5F 5T LOGIC - [id number]  COMPONENT TIB VIK FIT 5F 5T LOGIC [id number] [employer] NA Left 1 Implanted   COMPONENT PAT 38MM 3 PEG 3 RND LUZ STD VIK NP W/O XR WIRE - [id number]  COMPONENT PAT 38MM 3 PEG 3 RND LUZ STD VIK NP W/O XR WIRE [id number] [employer] NA Left 1 Implanted   COMPONENT FEM SZ 5 LT CRUC RET VIK TRULIANT - [id number]  COMPONENT FEM SZ 5 LT CRUC RET VIK TRULIANT [id number] [employer] NA Left 1 Implanted   INSERT TIB CR 5 11 MM TRULIANT - [id number]  INSERT TIB CR 5 11 MM TRULIANT [id number] [employer] NA Left 1 Implanted       Drains: none    Findings: oa    Electronically Signed by Nancy Carr MD on 11/10/2022 at 11:04 AM

## 2022-11-10 NOTE — H&P
[facility] PRE-OP HISTORY AND PHYSICAL    Subjective:     Patient is a 49 y.o. male presented with a history of left knee pain. Onset of symptoms was gradual with gradually worsening course since that time. He is being admitted for surgical management of this condition. There are no problems to display for this patient. Past Medical History:   Diagnosis Date    Arthritis 1/99    Left knee    Autoimmune disease ([address] 2020? Arthritis specialist    Depression     GERD (gastroesophageal reflux disease) 10/2014    As needed    Hypertension 2018? Secondary to mental health medications    Migraines     Migrainous vertigo     Nausea & vomiting 6/15/1989    After every surgery    Raynaud's disease     Thyroid disease 2018? Unsure of actual date      Past Surgical History:   Procedure Laterality Date    HX KNEE ARTHROSCOPY Left     X5    HX ORTHOPAEDIC Left 01/01/2006    Partial Replacement Knee    [place] OSTEOCHONDRAL KNEE ALLOGRAFT Left 2000      Prior to Admission medications    Medication Sig Start Date End Date Taking? Authorizing Provider   dextroamphetamine-amphetamine (ADDERALL) 20 mg tablet Take 20 mg by mouth two (2) times a day. Yes Provider, Historical   buPROPion SR (WELLBUTRIN SR) 150 mg SR tablet Take  by mouth two (2) times a day. Yes Provider, Historical   propranolol LA (INDERAL LA) 60 mg SR capsule Take  by mouth nightly. Yes Provider, Historical   liothyronine (CYTOMEL) 5 mcg tablet Take 5 mcg by mouth Daily (before breakfast). Yes Provider, Historical   levothyroxine (SYNTHROID) 50 mcg tablet Take  by mouth Daily (before breakfast). Yes Provider, Historical   folic acid (FOLVITE) 1 mg tablet Take  by mouth daily. Yes Provider, Historical   traZODone (DESYREL) 50 mg tablet Take  by mouth nightly as needed for Sleep. Yes Provider, Historical   Cetirizine (ZyrTEC) 10 mg cap Take  by mouth nightly.    Yes Provider, Historical   magnesium carb,citrate,oxide (Magnesium Complex) 300 mg magnesium tab Take  by mouth nightly. Yes Provider, Historical   diazePAM (Valium) 5 mg tablet Take 1 Tab by mouth every twelve (12) hours as needed (vertigo). Max Daily Amount: 10 mg. 9/16/20  Yes Jarad Bai MD   MULTIVITAMIN PO Take  by mouth. Yes Provider, Historical   methotrexate (RHEUMATREX) 2.5 mg tablet Take 20 mg by mouth every Wednesday. Provider, Historical   meclizine 50 mg tablet Take 50 mg by mouth two (2) times daily as needed. Provider, Historical   CALCIUM CARBONATE (ANTACID CALCIUM PO) Take  by mouth as needed. Provider, Historical     Allergies   Allergen Reactions    Percocet [Oxycodone-Acetaminophen] Other (comments)     vomiting      Social History     Tobacco Use    Smoking status: Never    Smokeless tobacco: Never   Substance Use Topics    Alcohol use: Yes     Alcohol/week: 2.0 standard drinks     Types: 2 Cans of beer per week     Comment: socially      Family History   Problem Relation Age of Onset    No Known Problems Mother     Heart Disease Father         Mild    Hypertension Father         Mild    OSTEOARTHRITIS Father         Since he was adult    Arthritis-rheumatoid Father     Anesth Problems Father         PONV    No Known Problems Sister       Review of Systems  A comprehensive review of systems was negative except for that written in the HPI. Objective:     Patient Vitals for the past 8 hrs:   BP Temp Pulse Resp SpO2 Height Weight   11/10/22 0611 127/78 98.6 °F (37 °C) (!) 43 15 100 % 6' 4\" (1.93 m) 175 lb 11.3 oz (79.7 kg)     Visit Vitals  /78 (BP 1 Location: Right arm, BP Patient Position: At rest)   Pulse (!) 43   Temp 98.6 °F (37 °C)   Resp 15   Ht 6' 4\" (1.93 m)   Wt 175 lb 11.3 oz (79.7 kg)   SpO2 100%   BMI 21.39 kg/m²     General:  Alert, cooperative, no distress, appears stated age. Head:  Normocephalic, without obvious abnormality, atraumatic. Eyes:  Conjunctivae/corneas clear. PERRL, EOMs intact.  Fundi benign   Ears:  Normal TMs and external ear canals both ears. Nose: Nares normal. Septum midline. Mucosa normal. No drainage or sinus tenderness. Throat: Lips, mucosa, and tongue normal. Teeth and gums normal.   Neck: Supple, symmetrical, trachea midline, no adenopathy, thyroid: no enlargement/tenderness/nodules, no carotid bruit and no JVD. Back:   Symmetric, no curvature. ROM normal. No CVA tenderness. Lungs:   Clear to auscultation bilaterally. Chest wall:  No tenderness or deformity. Heart:  Regular rate and rhythm, S1, S2 normal, no murmur, click, rub or gallop. Abdomen:   Soft, non-tender. Bowel sounds normal. No masses,  No organomegaly. Extremities: Left knee: incision healed. +effusion. nvi   Pulses: 2+ and symmetric all extremities. Skin: Skin color, texture, turgor normal. No rashes or lesions   Lymph nodes: Cervical, supraclavicular, and axillary nodes normal.   Neurologic: CNII-XII intact. Normal strength, sensation and reflexes throughout. Assessment:     Active Problems:    * No active hospital problems. *      Plan:   The various methods of treatment have been discussed with the patient and family. After consideration of risks, benefits and other options for treatment, the patient has consented to surgical intervention. Risks of infection, DVT, PE, MI, CVA and unforeseen events described to the patient. Additionally discussed the possibility of not being able to resolve all preop pain. Patient understands metal and plastic will be implanted in the body and understands the potential for revision surgery. Patient wishes to proceed with elective surgery.

## 2022-11-10 NOTE — PERIOP NOTES
TRANSFER - OUT REPORT:    Verbal report given to Pat More RN on Adeline Signs  being transferred to Room 575 for routine progression of care       Report consisted of patients Situation, Background, Assessment and   Recommendations(SBAR). Time Pre op antibiotic SNJEF:6229  Anesthesia Stop time: 0723    Information from the following report(s) Procedure Summary, Intake/Output, and MAR was reviewed with the receiving nurse. Opportunity for questions and clarification was provided. Is the patient on 02? NO       L/Min        Other     Is the patient on a monitor? NO    Is the nurse transporting with the patient? NO    Surgical Waiting Area notified of patient's transfer from PACU? YES      The following personal items collected during your admission accompanied patient upon transfer:   Dental Appliance:    Vision:    Hearing Aid:    Jewelry:    Clothing: Clothing:  At bedside (Returned in PACU)  Other Valuables:    Valuables sent to safe:

## 2022-11-10 NOTE — DISCHARGE INSTRUCTIONS
[facility]. Total Knee Replacement   Post-Op Discharge Instructions Knee   Dr. Reinier Espino    Patient Name  Nell Reddy  Date of procedure  11/10/2022    Procedure  Procedure(s):  LEFT KNEE REVISION FROM UNI TO LEFT TOTAL KNEE ARTHROPLASTY  Surgeon  Surgeon(s) and Role:     Gerald Waite MD - Primary  PCP: Boone Peabody, MD    Follow up care  Follow up visit with Dr. Itz Taveras in 2-3 weeks. Call [phone], extension [phone] to make an appointment    Activity at home  Take a short walk every hour; except at night when sleeping  Do your Home Exercise Program 3 times every day   After exercising lie down and elevate your leg on pillows for 15-30 minutes to decrease swelling  Refer to your patient notebook for more information   Preventing blood clots  Take Warfarin (Coumadin) every day as prescribed. Do not take aspirin or anti-inflammatory medicine while taking Warfarin  Wear elastic stockings (TEDS) for 4 weeks. You may remove them daily for 1 hour when shoering/sponge-bathing. Call Dr. Itz Taveras if you have side effects of blood thinning medication: bleeding, bruising, upset stomach, or diarrhea. Call Dr. Itz Taveras for signs of a blood clot in your leg: calf pain, tenderness, redness, swelling of lower leg   Preventing lung congestion  Use your incentive spirometer 4 times a day; do 10 repetitions each time  Remember to keep the small blue ball between the two arrows when taking a slow, deep breath   Pain Management  Get up and walk a short distance to relieve pain and stiffness. Place ice wrap on your knee except when you are walking. The gel ice packs should be changed about every 4 hours  Elevate your leg on pillows for 15-30 minutes   If needed, take a narcotic pain pill every 4-6 hours as prescribed. Take all medications with a small amount of food.    As your pain decreases, take the narcotics less often or take ½ of a pill  Call Dr. Itz Taveras if you have side effects from your narcotic pain medication: itching, drowsiness, dizziness, upset stomach, dry mouth, constipation or if you medication is not relieving your pain. Diet after surgery  You may resume your normal diet. Include vegetables, fruit, whole grains, lean meats, and low-fat dairy products. Eat food high in fiber   Drink plenty of fluids, including 8 cups of water daily  Take Senokot-s twice a day to prevent constipation    Avoid after surgery  Do not take any over-the-counter medication for pain except Tylenol  Do not take more than 3000mg (3 grams) of Tylenol in 24 hours. Do not drink alcoholic beverages  Do not smoke  Do not drive until seen for follow up appointment  Do not place frozen gel pack directly on your skin. It can cause frostbite. Do not take a tub bath, swim or get in a hot tub for 6 weeks  Prevention of falls and safety at home  Set up an area where you can rest comfortably leaving space around furniture to allow you to walk with your walker  Keep stairs, hallways and bathrooms well lit; especially at night  Arrange for care for your pets  Keep your home free of clutter   Call Dr. Alaina Kim at [phone] for:  Pain that is not relieved by pain medication, ice and activity  Side effects of medications  Increased/spread of bruising  Warning signs of infection:  persistent fever greater than 100 degrees  shaking or chills  increased redness, tenderness, swelling or drainage from incision  increased pain during activity or rest  Warning signs of a blood clot in your leg:  increased pain in your calf  tenderness or redness  increased swelling or knee, calf, ankle or foot    Call [phone] after 5pm or on a weekend.  The on call physician will return your phone call  Call your Primary Care Doctor for:   Concerns about your medical conditions such as diabetes, high blood pressure, asthma, congestive heart failure  Blood sugars greater than 180  Persistent headache or dizziness  Coughing or congestion  Constipation or diarrhea  Burning when you go to the bathroom  Abnormal heart rate (fast or slow)      Call 911 and go to the nearest hospital for:   Sudden increased shortness of breath  Sudden onset of chest pain  Difficulty breathing  Localized chest pain with coughing or taking a deep breath     Home Health Care  Physical therapy   3 times a week for 3 weeks. Routine exercises, transfers, gait training, active straight leg raises  Home Nursing  Draw PT/INR every Wednesday for 3 weeks. Fax the results to Dr. Laure José at [phone]. If results are abnormal call [phone], extension [phone].

## 2022-11-10 NOTE — ANESTHESIA PROCEDURE NOTES
Spinal Block    Start time: 11/10/2022 7:31 AM  End time: 11/10/2022 7:37 AM  Performed by: Nilesh José CRNA  Authorized by: Philipp Gold MD     Pre-procedure:   Indications: primary anesthetic  Preanesthetic Checklist: patient identified, risks and benefits discussed, anesthesia consent, site marked, patient being monitored, timeout performed and fire risk safety assessment completed and verbalized    Timeout Time: 07:30 EST      Spinal Block:   Patient Position:  Seated  Prep Region:  Lumbar  Prep: Betadine      Location:  L4-5  Technique:  Single shot  Local: bupivacaine (PF) (MARCAINE) 0.5 % (5 mg/mL) intrathecal - Intrathecal   12.5 mg - 11/10/2022 7:37:00 AM    Med Admin Time: 11/10/2022 7:37 AM    Needle:   Needle Type:  Pencan  Needle Gauge:  24 G  Attempts:  1      Events: CSF confirmed, no blood with aspiration and no paresthesia        Assessment:  Insertion:  Uncomplicated  Patient tolerance:  Patient tolerated the procedure well with no immediate complications

## 2022-11-10 NOTE — PROGRESS NOTES
Problem: Mobility Impaired (Adult and Pediatric)  Goal: *Acute Goals and Plan of Care (Insert Text)  Description: FUNCTIONAL STATUS PRIOR TO ADMISSION: Patient was independent and active without use of DME.    HOME SUPPORT PRIOR TO ADMISSION: The patient lived with wife but did not require assist.    Physical Therapy Goals  Initiated 11/10/2022    1. Patient will move from supine to sit and sit to supine , scoot up and down, and roll side to side in bed with modified independence within 4 days. 2. Patient will perform sit to stand with modified independence within 4 days. 3. Patient will ambulate with modified independence for 150 feet with the least restrictive device within 4 days. 4. Patient will ascend/descend 4 stairs with cane and one handrail(s) with modified independence within 4 days. 5. Patient will perform home exercise program per protocol with independence within 4 days. 6. Patient will demonstrate AROM 0-90 degrees in operative joint within 4 days. Outcome: Progressing Towards Goal   PHYSICAL THERAPY EVALUATION  Patient: Junior Rodrigez (28 y.o. male)  Date: 11/10/2022  Primary Diagnosis: Primary localized osteoarthritis of left knee [M17.12]  Procedure(s) (LRB):  LEFT KNEE REVISION FROM UNI TO LEFT TOTAL KNEE ARTHROPLASTY (Left) Day of Surgery   Precautions:   Fall, WBAT    ASSESSMENT  Based on the objective data described below, the patient presents with  impairment in functional mobility, activity tolerance and balance s/p L knee revision from uni to TKA. PLOF: Independent with ADLs and IADLs. Patient lives with wife in a one story home with 6 steps and rail to enter. Patient's mobility was on target for POD#0. Will address more exercises, increase gait distance, negotiate stairs and assess for discharge at am PT session tomorrow. Patient instructed NOT to get up from bed, chair or commode without calling for assistance.  Initiated post TKA exercise protocol and wrote same on [employer]. .    Current Level of Function Impacting Discharge (mobility/balance): Contact guard assistance for all mobility. Ambulated 72 ft with RW and gait belt, antalgic, decreased step clearance, decreased stance  with flexed knee on left, step-to patter, but steady. Functional Outcome Measure: The patient scored 55/100 on the Barthel outcome measure which is indicative of moderate amimpaired ability to care for basic self-needs/dependency on others. .      Other factors to consider for discharge: Motivated/A & O x 4/Supportive Family/Independent PLOF      Patient will benefit from skilled therapy intervention to address the above noted impairments. PLAN :  Recommendations and Planned Interventions: bed mobility training, transfer training, gait training, therapeutic exercises, patient and family training/education, and therapeutic activities      Frequency/Duration: Patient will be followed by physical therapy:  twice daily to address goals. Recommendation for discharge: (in order for the patient to meet his/her long term goals)  Physical therapy at least 2 days/week in the home     This discharge recommendation:  Has been made in collaboration with the attending provider and/or case management    IF patient discharges home will need the following DME: patient owns DME required for discharge         SUBJECTIVE:   Patient stated This knee is really starting to hurt.     OBJECTIVE DATA SUMMARY:   HISTORY:    Past Medical History:   Diagnosis Date    Arthritis 1/99    Left knee    Autoimmune disease ([facility] [address] 2020? Arthritis specialist    Depression     GERD (gastroesophageal reflux disease) 10/2014    As needed    Hypertension 2018? Secondary to mental health medications    Migraines     Migrainous vertigo     Nausea & vomiting 6/15/1989    After every surgery    Raynaud's disease     Thyroid disease 2018?     Unsure of actual date     Past Surgical History:   Procedure Laterality Date HX KNEE ARTHROSCOPY Left     X5    HX ORTHOPAEDIC Left 01/01/2006    Partial Replacement Knee    NV OSTEOCHONDRAL KNEE ALLOGRAFT Left 2000       Personal factors and/or comorbidities impacting plan of care: Motivated/A & O x 4/Supportive Family/Independent PLOF     Home Situation  Home Environment: Private residence  # Steps to Enter: 6  Rails to Enter: Yes  Hand Rails : Right  Wheelchair Ramp: No  One/Two Story Residence: One story  Living Alone: No  Support Systems: Spouse/Significant Other  Patient Expects to be Discharged to[de-identified] Home with home health  Current DME Used/Available at Home: [employer], straight, Walker, rolling  Tub or Shower Type: Shower    EXAMINATION/PRESENTATION/DECISION MAKING:   Critical Behavior:  Neurologic State: Alert  Orientation Level: Oriented X4  Cognition: Follows commands     Hearing:     Skin:  Ace Wrap Intact with no drainage appreciated. Edema: Normal post-op inflammation   Range Of Motion:  AROM: Generally decreased, functional           PROM: Generally decreased, functional           Strength:    Strength: Generally decreased, functional                    Tone & Sensation:   Tone: Normal              Sensation: Intact               Coordination:  Coordination: Within functional limits  Vision:      Functional Mobility:  Bed Mobility:     Supine to Sit: Contact guard assistance  Sit to Supine: Contact guard assistance  Scooting: Contact guard assistance  Transfers:  Sit to Stand: Contact guard assistance  Stand to Sit: Contact guard assistance                       Balance:   Sitting: Intact  Standing: Intact; With support  Ambulation/Gait Training:  Distance (ft): 65 Feet (ft)  Assistive Device: Walker, rolling;Gait belt  Ambulation - Level of Assistance: Contact guard assistance        Gait Abnormalities: Antalgic;Decreased step clearance; Step to gait     Left Side Weight Bearing: As tolerated  Base of Support: Widened;Shift to right  Stance: Left decreased (standing on flexed knee)  Speed/Nancy: Slow  Step Length: Right shortened  Swing Pattern: Left asymmetrical     Interventions: Safety awareness training;Verbal cues              Therapeutic Exercises: Ankle Pumps  Ham Sets  Quad Sets  Heel Slides  X 10 each, 5-7x daily      Functional Measure:  Barthel Index:    Bathin  Bladder: 10  Bowels: 10  Groomin  Dressin  Feeding: 10  Mobility: 0  Stairs: 0  Toilet Use: 5  Transfer (Bed to Chair and Back): 10  Total: 55/100       The Barthel ADL Index: Guidelines  1. The index should be used as a record of what a patient does, not as a record of what a patient could do. 2. The main aim is to establish degree of independence from any help, physical or verbal, however minor and for whatever reason. 3. The need for supervision renders the patient not independent. 4. A patient's performance should be established using the best available evidence. Asking the patient, friends/relatives and nurses are the usual sources, but direct observation and common sense are also important. However direct testing is not needed. 5. Usually the patient's performance over the preceding 24-48 hours is important, but occasionally longer periods will be relevant. 6. Middle categories imply that the patient supplies over 50 per cent of the effort. 7. Use of aids to be independent is allowed. Score Interpretation (from [address] [address])    Independent   60-79 Minimally independent   40-59 Partially dependent   20-39 Very dependent   <20 Totally dependent     -Harper Jacobs., Barthel, D.W. (1965). Functional evaluation: the Barthel Index. [facility] ([address] (). The Barthel activities of daily living index: self-reporting versus actual performance in the old (> or = 75 years). Journal of [address] [place] [address] 45(7), 14 [facility], .DENG, Sarah Olsen., Parker Evi. (1999).  Measuring the change in disability after inpatient rehabilitation; comparison of the responsiveness of the Barthel Index and Functional [place] Measure. Journal of Neurology, Neurosurgery, and Psychiatry, 66(4), 242-427. ALVARO Segovia.A, KELLY Hernandez, & Kyle Arguelles M.A. (2004) Assessment of post-stroke quality of life in cost-effectiveness studies: The usefulness of the Barthel Index and the EuroQoL-5D. Quality of Life Research, 15, 021-81           Physical Therapy Evaluation Charge Determination   History Examination Presentation Decision-Making   MEDIUM  Complexity : 1-2 comorbidities / personal factors will impact the outcome/ POC  LOW Complexity : 1-2 Standardized tests and measures addressing body structure, function, activity limitation and / or participation in recreation  LOW Complexity : Stable, uncomplicated  LOW Complexity : FOTO score of       Based on the above components, the patient evaluation is determined to be of the following complexity level: LOW     Pain Ratin/10 (asked nurse for pain meds)    Activity Tolerance:   Good    After treatment patient left in no apparent distress:   Supine in bed, Call bell within reach, Caregiver / family present, Side rails x 3, and nurse notified. COMMUNICATION/EDUCATION:   The patients plan of care was discussed with: Registered nurse, Physician, Case management, and Rehabilitation technician. Fall prevention education was provided and the patient/caregiver indicated understanding., Patient/family have participated as able in goal setting and plan of care. , and Patient/family agree to work toward stated goals and plan of care.     Thank you for this referral.  Cristpoher Knee   Time Calculation: 28 mins

## 2022-11-11 VITALS
TEMPERATURE: 97.5 F | WEIGHT: 175.71 LBS | BODY MASS INDEX: 21.4 KG/M2 | RESPIRATION RATE: 16 BRPM | HEIGHT: 76 IN | DIASTOLIC BLOOD PRESSURE: 68 MMHG | SYSTOLIC BLOOD PRESSURE: 116 MMHG | HEART RATE: 65 BPM | OXYGEN SATURATION: 100 %

## 2022-11-11 DIAGNOSIS — Z96.659 HISTORY OF TOTAL KNEE REPLACEMENT, UNSPECIFIED LATERALITY: Primary | ICD-10-CM

## 2022-11-11 LAB
HGB BLD-MCNC: 10.2 G/DL (ref 12.1–17)
INR PPP: 1.1 (ref 0.9–1.1)
PROTHROMBIN TIME: 11.1 SEC (ref 9–11.1)

## 2022-11-11 PROCEDURE — 74011250637 HC RX REV CODE- 250/637: Performed by: ORTHOPAEDIC SURGERY

## 2022-11-11 PROCEDURE — 36415 COLL VENOUS BLD VENIPUNCTURE: CPT

## 2022-11-11 PROCEDURE — 85610 PROTHROMBIN TIME: CPT

## 2022-11-11 PROCEDURE — G0378 HOSPITAL OBSERVATION PER HR: HCPCS

## 2022-11-11 PROCEDURE — 85018 HEMOGLOBIN: CPT

## 2022-11-11 PROCEDURE — 74011250636 HC RX REV CODE- 250/636: Performed by: ORTHOPAEDIC SURGERY

## 2022-11-11 PROCEDURE — 97116 GAIT TRAINING THERAPY: CPT

## 2022-11-11 PROCEDURE — 74011000250 HC RX REV CODE- 250: Performed by: ORTHOPAEDIC SURGERY

## 2022-11-11 RX ORDER — WARFARIN SODIUM 5 MG/1
5 TABLET ORAL ONCE
Status: COMPLETED | OUTPATIENT
Start: 2022-11-11 | End: 2022-11-11

## 2022-11-11 RX ORDER — HYDROMORPHONE HYDROCHLORIDE 2 MG/1
2 TABLET ORAL
Qty: 40 TABLET | Refills: 0 | Status: SHIPPED | OUTPATIENT
Start: 2022-11-11 | End: 2022-11-18

## 2022-11-11 RX ADMIN — SENNOSIDES AND DOCUSATE SODIUM 1 TABLET: 50; 8.6 TABLET ORAL at 11:39

## 2022-11-11 RX ADMIN — HYDROMORPHONE HYDROCHLORIDE 4 MG: 2 TABLET ORAL at 03:51

## 2022-11-11 RX ADMIN — BUPROPION HYDROCHLORIDE 150 MG: 150 TABLET, EXTENDED RELEASE ORAL at 11:38

## 2022-11-11 RX ADMIN — DEXTROAMPHETAMINE SACCHARATE, AMPHETAMINE ASPARTATE, DEXTROAMPHETAMINE SULFATE, AMPHETAMINE SULFATE TABLETS, 10 MG,CLL 20 MG: 2.5; 2.5; 2.5; 2.5 TABLET ORAL at 11:38

## 2022-11-11 RX ADMIN — SODIUM CHLORIDE, PRESERVATIVE FREE 10 ML: 5 INJECTION INTRAVENOUS at 06:51

## 2022-11-11 RX ADMIN — Medication 400 MG: at 11:38

## 2022-11-11 RX ADMIN — HYDROMORPHONE HYDROCHLORIDE 4 MG: 2 TABLET ORAL at 07:46

## 2022-11-11 RX ADMIN — HYDROMORPHONE HYDROCHLORIDE 4 MG: 2 TABLET ORAL at 11:35

## 2022-11-11 RX ADMIN — MULTIPLE VITAMINS W/ MINERALS TAB 1 TABLET: TAB at 11:38

## 2022-11-11 RX ADMIN — FAMOTIDINE 20 MG: 20 TABLET, FILM COATED ORAL at 11:39

## 2022-11-11 RX ADMIN — WARFARIN SODIUM 5 MG: 5 TABLET ORAL at 11:35

## 2022-11-11 RX ADMIN — KETOROLAC TROMETHAMINE 15 MG: 30 INJECTION, SOLUTION INTRAMUSCULAR; INTRAVENOUS at 06:51

## 2022-11-11 RX ADMIN — LIOTHYRONINE SODIUM 5 MCG: 5 TABLET ORAL at 06:50

## 2022-11-11 RX ADMIN — LEVOTHYROXINE SODIUM 50 MCG: 0.05 TABLET ORAL at 06:50

## 2022-11-11 NOTE — PROGRESS NOTES
Bedside shift change report given to [address] (oncoming nurse) by Aníbal Tijerina (offgoing nurse). Report included the following information SBAR, Kardex, Intake/Output, MAR, and Recent Results.

## 2022-11-11 NOTE — PROGRESS NOTES
Problem: Mobility Impaired (Adult and Pediatric)  Goal: *Acute Goals and Plan of Care (Insert Text)  Description: FUNCTIONAL STATUS PRIOR TO ADMISSION: Patient was independent and active without use of DME.    HOME SUPPORT PRIOR TO ADMISSION: The patient lived with wife but did not require assist.    Physical Therapy Goals  Initiated 11/10/2022    1. Patient will move from supine to sit and sit to supine , scoot up and down, and roll side to side in bed with modified independence within 4 days. 2. Patient will perform sit to stand with modified independence within 4 days. 3. Patient will ambulate with modified independence for 150 feet with the least restrictive device within 4 days. 4. Patient will ascend/descend 4 stairs with cane and one handrail(s) with modified independence within 4 days. 5. Patient will perform home exercise program per protocol with independence within 4 days. 6. Patient will demonstrate AROM 0-90 degrees in operative joint within 4 days. 11/11/2022 1035 by Snow Brand  Outcome: Resolved/Met   PHYSICAL THERAPY TREATMENT/DISCHARGE  Patient: Clifford Johns (87 y.o. male)  Date: 11/11/2022  Diagnosis: Primary localized osteoarthritis of left knee [M17.12] <principal problem not specified>  Procedure(s) (LRB):  LEFT KNEE REVISION FROM UNI TO LEFT TOTAL KNEE ARTHROPLASTY (Left) 1 Day Post-Op  Precautions: Fall, WBAT  Chart, physical therapy assessment, plan of care and goals were reviewed. ASSESSMENT  Patient continues with skilled PT services and has met acute care PT goals. Patient is cleared for discharge from PT standpoint. Patient  is independent with post-op TKA exercise protocol and has same in written, illlustrated form. Educated patient on Discharge Instructions relating to PT program progression post-discharge. He demonstrated/verbalized understanding and watched Discharge Video.     Barthel Functional Score has improved to 90/100, indicating minimal impaired ability to care for basic self-needs/dependency on others. Other factors to consider for discharge: Motivated/A & O x 4/Supportive Family/Independent PLOF          PLAN :  Patient will be discharged from acute skilled physical therapy at this time. Rationale for discharge:  Goals achieved    Recommendation for discharge: (in order for the patient to meet his/her long term goals)  Physical therapy at least 2 days/week in the home     This discharge recommendation:  Has been made in collaboration with the attending provider and/or case management    IF patient discharges home will need the following DME: patient owns DME required for discharge       SUBJECTIVE:   Patient stated I am doing much better than yesterday. Meme Gomez    OBJECTIVE DATA SUMMARY:   Critical Behavior:  Neurologic State: Alert  Orientation Level: Oriented X4  Cognition: Appropriate for age attention/concentration     Functional Mobility Training:  Bed Mobility:     Supine to Sit: Modified independent  Sit to Supine: Modified independent  Scooting: Modified independent        Transfers:  Sit to Stand: Modified independent  Stand to Sit: Modified independent        Bed to Chair: Modified independent                    Balance:  Sitting: Intact  Standing: Intact; With support  Ambulation/Gait Training:  Distance (ft): 300 Feet (ft)  Assistive Device: Walker, rolling;Gait belt  Ambulation - Level of Assistance: Modified independent        Gait Abnormalities: Antalgic     Left Side Weight Bearing: As tolerated  Base of Support: Widened;Shift to right  Stance: Left decreased  Speed/Nancy: Slow  Step Length: Right shortened  Swing Pattern: Left asymmetrical     Interventions: Safety awareness training;Verbal cues           Stairs:  Number of Stairs Trained: 4  Stairs - Level of Assistance: Modified independent   Rail Use: Right  (right Rail and cane)    Therapeutic Exercises:   Patient  is independent with post-op TKA exercise protocol and has same in written, illlustrated form. Barthel Index:    Bathin  Bladder: 10  Bowels: 10  Groomin  Dressin  Feeding: 10  Mobility: 15  Stairs: 10  Toilet Use: 10  Transfer (Bed to Chair and Back): 15  Total: 90/100       The Barthel ADL Index: Guidelines  1. The index should be used as a record of what a patient does, not as a record of what a patient could do. 2. The main aim is to establish degree of independence from any help, physical or verbal, however minor and for whatever reason. 3. The need for supervision renders the patient not independent. 4. A patient's performance should be established using the best available evidence. Asking the patient, friends/relatives and nurses are the usual sources, but direct observation and common sense are also important. However direct testing is not needed. 5. Usually the patient's performance over the preceding 24-48 hours is important, but occasionally longer periods will be relevant. 6. Middle categories imply that the patient supplies over 50 per cent of the effort. 7. Use of aids to be independent is allowed. Score Interpretation (from [address] [address])    Independent   60-79 Minimally independent   40-59 Partially dependent   20-39 Very dependent   <20 Totally dependent     -Harper Jacobs., Barthel, D.W. (1965). Functional evaluation: the Barthel Index. [facility] ([address] (1997). The Barthel activities of daily living index: self-reporting versus actual performance in the old (> or = 75 years). Journal of [address] [place] [address] 45(7), 14 [facility], J.J.M.F, Sushma Matthews., Gracia Land. (1999). Measuring the change in disability after inpatient rehabilitation; comparison of the responsiveness of the Barthel Index and Functional [place] Measure. Journal of Neurology, Neurosurgery, and Psychiatry, 66(4), 320-740.   -Hobart Ormond, NAMAURY.CINTIA, KELLY Hernandez, Anika Sanders MZacharyA. (2004) Assessment of post-stroke quality of life in cost-effectiveness studies: The usefulness of the Barthel Index and the EuroQoL-5D. Quality of Life Research, 13, 427-43      Pain Ratin/10    Activity Tolerance:   Good    After treatment patient left in no apparent distress:   Supine in bed, Call bell within reach, Side rails x 3, and nurse notified. COMMUNICATION/COLLABORATION:   The patients plan of care was discussed with: Registered nurse, Physician, and Case management.      Ed Click   Time Calculation: 25 mins

## 2022-11-11 NOTE — PROGRESS NOTES
Pharmacist Note - Warfarin Dosing  Consult provided for this 49 y.o. male to manage warfarin for DVT ppk    INR Goal: 1.7- 2.2    Therapy Day: 1    Preop Dose: Meena Mares- none    Drugs that may increase INR: liothyronine  Drugs that may decrease INR: None  Other current anticoagulants/ drugs that may increase bleeding risk: None  Risk factors: None  Daily INR ordered: YES    Recent Labs     11/11/22  0145 11/10/22  1443   HGB 10.2*  --    INR 1.1 1.1       Date               INR                 Dose  11/10  ---  5 mg   11/11               ---                    5 mg   Assessment/ Plan: Will order warfarin 5 mg PO x 1 dose. Pharmacy will continue to monitor daily and adjust therapy as indicated.        Breann Finnegan, PharmD

## 2022-11-11 NOTE — PROGRESS NOTES
POD 1 Day Post-Op    Procedure:  Procedure(s):  LEFT KNEE REVISION FROM UNI TO LEFT TOTAL KNEE ARTHROPLASTY    Subjective:     Patient doing well postoperatively. No complaints. Pain controlled with medication. Objective:     Blood pressure 116/68, pulse 65, temperature 97.5 °F (36.4 °C), resp. rate 16, height 6' 4\" (1.93 m), weight 175 lb 11.3 oz (79.7 kg), SpO2 100 %. Temp (24hrs), Av.4 °F (36.3 °C), Min:97.1 °F (36.2 °C), Max:97.9 °F (36.6 °C)      Physical Exam:  Examination of the left knee reveals that the incision is clean, dry and intact. Sensation is intact to light touch.  mild swelling. No calf pain.   NVI    Labs:   Lab Results   Component Value Date/Time    HGB 10.2 (L) 2022 01:45 AM         Assessment:     Active Problems:    Primary localized osteoarthritis of left knee (11/10/2022)        Plan/Recommendations/Medical Decision Making:     Continue physical therapy  Ice and elevate  Continue coumadin for DVT prophylaxis  Discharge home today after PT

## 2022-11-11 NOTE — PROGRESS NOTES
Transition of Care: Plan for discharge home with wife and HH. [facility] care has accepted patient. Patient owns rolling walker. Family will transport patient home at discharge. Care Management Interventions  PCP Verified by CM: Yes  Mode of Transport at Discharge: Other (see comment) (family/car)  Transition of Care Consult (CM Consult): Home Health, Discharge [address]i [address] [address]: No  Reason Outside [place]: Physician referred to specific agency  MyChart Signup: No  Discharge Durable Medical Equipment: No  Physical Therapy Consult: Yes  Occupational Therapy Consult: Yes  Support Systems: Spouse/Significant Other  Confirm Follow Up Transport: Family  The Patient and/or Patient Representative was Provided with a Choice of Provider and Agrees with the Discharge Plan?: Yes  Freedom of Choice List was Provided with Basic Dialogue that Supports the Patient's Individualized Plan of Care/Goals, Treatment Preferences and Shares the Quality Data Associated with the Providers?: Yes  Discharge Location  Patient Expects to be Discharged to[de-identified] Home with home health        The Plan for Transition of Care is related to the following treatment goals: home health, patient prefers [facility]    The Patient and/or patient representative  was provided with a choice of provider and agrees   with the discharge plan. [x] Yes [] No    Freedom of choice list was provided with basic dialogue that supports the patient's individualized plan of care/goals, treatment preferences and shares the quality data associated with the providers.  [x] Yes [] No      LETTY Larose/REYNALDO

## 2022-11-17 NOTE — DISCHARGE SUMMARY
Discharge Summary     Patient ID:  Owen Lovelace  [id number]  69 y.o.  1970    Admit Date: 11/10/2022    Discharge Date:     Admission Diagnoses: Primary localized osteoarthritis of left knee [M17.12]    Surgeon: Mary Shane MD    Last Procedure: Procedure(s):  LEFT KNEE REVISION FROM UNI TO LEFT TOTAL KNEE ARTHROPLASTY      Hospital Course: Normal hospital course for this procedure. Significant Diagnostic Studies: none    Discharge Diagnosis: Active Problems:    Primary localized osteoarthritis of left knee (11/10/2022)         Condition on Discharge: good    Disposition:Home    Followup Care:  Discharge Condition: good  See surgical instructions  Regular Diet  Keep wound clean and dry    Follow-up Information       Follow up With Specialties Details Why Contact Info    Osmel Harding MD [address] [address] [facility] [address] [address] [phone]      Dariusz Kapoor MD Orthopedic Surgery Schedule an appointment as soon as possible for a visit in 2 week(s)  [address] Follow up for home health [address] [place] [address] med list:   Discharge Medication List as of 11/11/2022 11:40 AM        START taking these medications    Details   warfarin (Coumadin) 2.5 mg tablet Take 1 Tablet by mouth daily. , Normal, Disp-60 Tablet, R-3      HYDROmorphone (DILAUDID) 2 mg tablet Take 2 Tablets by mouth every six (6) hours as needed for Pain for up to 3 days. Max Daily Amount: 16 mg., Print, Disp-40 Tablet, R-0           CONTINUE these medications which have NOT CHANGED    Details   dextroamphetamine-amphetamine (ADDERALL) 20 mg tablet Take 20 mg by mouth two (2) times a day., Historical Med      buPROPion SR (WELLBUTRIN SR) 150 mg SR tablet Take  by mouth two (2) times a day., Historical Med      propranolol LA (INDERAL LA) 60 mg SR capsule Take  by mouth nightly. , Historical Med      liothyronine (CYTOMEL) 5 mcg tablet Take 5 mcg by mouth Daily (before breakfast). , Historical Med      levothyroxine (SYNTHROID) 50 mcg tablet Take  by mouth Daily (before breakfast). , Historical Med      methotrexate (RHEUMATREX) 2.5 mg tablet Take 20 mg by mouth every Wednesday., Historical Med      folic acid (FOLVITE) 1 mg tablet Take  by mouth daily. , Historical Med      traZODone (DESYREL) 50 mg tablet Take  by mouth nightly as needed for Sleep., Historical Med      Cetirizine (ZyrTEC) 10 mg cap Take  by mouth nightly., Historical Med      magnesium carb,citrate,oxide (Magnesium Complex) 300 mg magnesium tab Take  by mouth nightly., Historical Med      meclizine 50 mg tablet Take 50 mg by mouth two (2) times daily as needed., Historical Med      diazePAM (Valium) 5 mg tablet Take 1 Tab by mouth every twelve (12) hours as needed (vertigo). Max Daily Amount: 10 mg., Normal, Disp-6 Tab,R-0      MULTIVITAMIN PO Take  by mouth., Historical Med      CALCIUM CARBONATE (ANTACID CALCIUM PO) Take  by mouth as needed., Historical Med                Home Going Instructions:   Patient to follow up in one - two weeks. Notify my office if develop fever, chills, redness, unbearble pain or temp.>102. Patient to follow discharge instructions. Patient to call [phone]. [phone] to set up follow up appointment.         Signed:  Dave Ag MD  11/17/2022  7:39 AM

## 2022-11-21 ENCOUNTER — OFFICE VISIT (OUTPATIENT)
Dept: ORTHOPEDIC SURGERY | Age: 52
End: 2022-11-21
Payer: COMMERCIAL

## 2022-11-21 VITALS — HEIGHT: 76 IN | WEIGHT: 175 LBS | BODY MASS INDEX: 21.31 KG/M2

## 2022-11-21 DIAGNOSIS — Z98.890 POSTOPERATIVE STATE: Primary | ICD-10-CM

## 2022-11-21 PROCEDURE — 99024 POSTOP FOLLOW-UP VISIT: CPT | Performed by: STUDENT IN AN ORGANIZED HEALTH CARE EDUCATION/TRAINING PROGRAM

## 2022-11-21 NOTE — PROGRESS NOTES
Andrés Garcia (: 1970) is a 49 y.o. male, patient, here for evaluation of the following chief complaint(s):  Surgical Follow-up and Knee Pain       HPI:    Patient presents for his first postoperative visit after left revision total knee arthroplasty. He is doing well. He does have some postoperative swelling that is preventing him from extending his knee. He has been taking his warfarin. His pain is well controlled. He is off his pain medication. Allergies   Allergen Reactions    Percocet [Oxycodone-Acetaminophen] Other (comments)     vomiting       Current Outpatient Medications   Medication Sig    warfarin (Coumadin) 2.5 mg tablet Take 1 Tablet by mouth daily. dextroamphetamine-amphetamine (ADDERALL) 20 mg tablet Take 20 mg by mouth two (2) times a day. buPROPion SR (WELLBUTRIN SR) 150 mg SR tablet Take  by mouth two (2) times a day. propranolol LA (INDERAL LA) 60 mg SR capsule Take  by mouth nightly. liothyronine (CYTOMEL) 5 mcg tablet Take 5 mcg by mouth Daily (before breakfast). levothyroxine (SYNTHROID) 50 mcg tablet Take  by mouth Daily (before breakfast). methotrexate (RHEUMATREX) 2.5 mg tablet Take 20 mg by mouth every Wednesday. folic acid (FOLVITE) 1 mg tablet Take  by mouth daily. traZODone (DESYREL) 50 mg tablet Take  by mouth nightly as needed for Sleep. Cetirizine (ZyrTEC) 10 mg cap Take  by mouth nightly.    magnesium carb,citrate,oxide (Magnesium Complex) 300 mg magnesium tab Take  by mouth nightly. meclizine 50 mg tablet Take 50 mg by mouth two (2) times daily as needed. diazePAM (Valium) 5 mg tablet Take 1 Tab by mouth every twelve (12) hours as needed (vertigo). Max Daily Amount: 10 mg. MULTIVITAMIN PO Take  by mouth. CALCIUM CARBONATE (ANTACID CALCIUM PO) Take  by mouth as needed. No current facility-administered medications for this visit.        Past Medical History:   Diagnosis Date    Arthritis     Left knee    [facility] 2020? Arthritis specialist    Depression     GERD (gastroesophageal reflux disease) 10/2014    As needed    Hypertension 2018? Secondary to mental health medications    Migraines     Migrainous vertigo     Nausea & vomiting 6/15/1989    After every surgery    Raynaud's disease     Thyroid disease 2018? Unsure of actual date        Past Surgical History:   Procedure Laterality Date    HX KNEE ARTHROSCOPY Left     X5    HX ORTHOPAEDIC Left 01/01/2006    Partial Replacement Knee    [place] OSTEOCHONDRAL KNEE ALLOGRAFT Left 2000       Family History   Problem Relation Age of Onset    No Known Problems Mother     Heart Disease Father         Mild    Hypertension Father         Mild    OSTEOARTHRITIS Father         Since he was adult    Arthritis-rheumatoid Father     Anesth Problems Father         PONV    No Known Problems Sister         Social History     Socioeconomic History    Marital status:      Spouse name: Not on file    Number of children: Not on file    Years of education: Not on file    Highest education level: Not on file   Occupational History    Not on file   Tobacco Use    Smoking status: Never    Smokeless tobacco: Never   Vaping Use    Vaping Use: Never used   Substance and Sexual Activity    Alcohol use:  Yes     Alcohol/week: 2.0 standard drinks     Types: 2 Cans of beer per week     Comment: socially    Drug use: No    Sexual activity: Yes     Partners: Female     Birth control/protection: Pill   Other Topics Concern    Not on file   Social History Narrative    Not on file     Social Determinants of Health     Financial Resource Strain: Not on file   Food Insecurity: Not on file   Transportation Needs: Not on file   Physical Activity: Not on file   Stress: Not on file   Social Connections: Not on file   Intimate Partner Violence: Not on file   Housing Stability: Not on file       Review of Systems    Vitals:  Ht 6' 4\" (1.93 m)   Wt 175 lb (79.4 kg)   BMI 21.30 kg/m²    Body mass index is 21.3 kg/m². Ortho Exam     Left knee: Incision well-healing Steri-Strips removed today. No erythema or drainage. Moderate effusion knee range of motion 5-100. Stable to varus valgus and AP stresses. Neurovascularly intact distally. ASSESSMENT/PLAN:    Patient is doing well after his left revision total knee arthroplasty. Under sterile conditions left knee was aspirated. Only 10 cc of bloody fluid was evacuated. We gave the patient a prescription for physical therapy to work on range of motion strengthening. He will follow-up in 1 month for reevaluation.         Dariusz Hdez MD

## 2022-12-07 ENCOUNTER — OFFICE VISIT (OUTPATIENT)
Dept: ORTHOPEDIC SURGERY | Age: 52
End: 2022-12-07
Payer: COMMERCIAL

## 2022-12-07 DIAGNOSIS — M25.572 ACUTE LEFT ANKLE PAIN: ICD-10-CM

## 2022-12-07 DIAGNOSIS — Z96.652 HISTORY OF LEFT KNEE REPLACEMENT: Primary | ICD-10-CM

## 2022-12-07 NOTE — PROGRESS NOTES
Ignacia Gillette (: 1970) is a 49 y.o. male, patient, here for evaluation of the following chief complaint(s):  Knee Pain (Post op left knee )       HPI:    Patient returns to the office status post total knee replacement left. He states he is doing well. He is noted a couple things. #1 he still carries a little bit of swelling in the knee. #2 he has had some pain across the ankle states the ankle actually is more painful than his knee. He points along the instep of his medial aspect of the ankle joint. He has not noted any swelling or black and blue. Lastly, he has had a couple episodes in which she has felt a shifty like sensation to his patella. The last time this happened was the night prior to this presentation    Allergies   Allergen Reactions    Percocet [Oxycodone-Acetaminophen] Other (comments)     vomiting       Current Outpatient Medications   Medication Sig    warfarin (Coumadin) 2.5 mg tablet Take 1 Tablet by mouth daily. dextroamphetamine-amphetamine (ADDERALL) 20 mg tablet Take 20 mg by mouth two (2) times a day. buPROPion SR (WELLBUTRIN SR) 150 mg SR tablet Take  by mouth two (2) times a day. propranolol LA (INDERAL LA) 60 mg SR capsule Take  by mouth nightly. liothyronine (CYTOMEL) 5 mcg tablet Take 5 mcg by mouth Daily (before breakfast). levothyroxine (SYNTHROID) 50 mcg tablet Take  by mouth Daily (before breakfast). methotrexate (RHEUMATREX) 2.5 mg tablet Take 20 mg by mouth every Wednesday. folic acid (FOLVITE) 1 mg tablet Take  by mouth daily. traZODone (DESYREL) 50 mg tablet Take  by mouth nightly as needed for Sleep. Cetirizine (ZyrTEC) 10 mg cap Take  by mouth nightly.    magnesium carb,citrate,oxide (Magnesium Complex) 300 mg magnesium tab Take  by mouth nightly. meclizine 50 mg tablet Take 50 mg by mouth two (2) times daily as needed. diazePAM (Valium) 5 mg tablet Take 1 Tab by mouth every twelve (12) hours as needed (vertigo).  Max Daily Amount: 10 mg. MULTIVITAMIN PO Take  by mouth. CALCIUM CARBONATE (ANTACID CALCIUM PO) Take  by mouth as needed. No current facility-administered medications for this visit. Past Medical History:   Diagnosis Date    Arthritis 1/99    Left knee    Autoimmune disease ([address] 2020? Arthritis specialist    Depression     GERD (gastroesophageal reflux disease) 10/2014    As needed    Hypertension 2018? Secondary to mental health medications    Migraines     Migrainous vertigo     Nausea & vomiting 6/15/1989    After every surgery    Raynaud's disease     Thyroid disease 2018? Unsure of actual date        Past Surgical History:   Procedure Laterality Date    HX KNEE ARTHROSCOPY Left     X5    HX ORTHOPAEDIC Left 01/01/2006    Partial Replacement Knee    [place] OSTEOCHONDRAL KNEE ALLOGRAFT Left 2000       Family History   Problem Relation Age of Onset    No Known Problems Mother     Heart Disease Father         Mild    Hypertension Father         Mild    OSTEOARTHRITIS Father         Since he was adult    Arthritis-rheumatoid Father     Anesth Problems Father         PONV    No Known Problems Sister         Social History     Socioeconomic History    Marital status:      Spouse name: Not on file    Number of children: Not on file    Years of education: Not on file    Highest education level: Not on file   Occupational History    Not on file   Tobacco Use    Smoking status: Never    Smokeless tobacco: Never   Vaping Use    Vaping Use: Never used   Substance and Sexual Activity    Alcohol use:  Yes     Alcohol/week: 2.0 standard drinks     Types: 2 Cans of beer per week     Comment: socially    Drug use: No    Sexual activity: Yes     Partners: Female     Birth control/protection: Pill   Other Topics Concern    Not on file   Social History Narrative    Not on file     Social Determinants of Health     Financial Resource Strain: Not on file   Food Insecurity: Not on file   Transportation Needs: Not on file   Physical Activity: Not on file   Stress: Not on file   Social Connections: Not on file   Intimate Partner Violence: Not on file   Housing Stability: Not on file       Review of Systems   Musculoskeletal:         Left knee      Vitals: There were no vitals taken for this visit. There is no height or weight on file to calculate BMI. Ortho Exam     Left knee: Incision is healing well. He has a small effusion. Range of motion is +5-92 degrees. I do not detect any gross instability with the patella. He is not able to reproduce the symptom as he demonstrates of these verbally. There was a small little shift that I saw on 1 extension of flexion movement. Again, no instability that I can appreciate. There is no swelling noted distally. Neurovascular examination is intact. Left ankle: No effusion. He has tenderness noted to palpation along the posterior tibial sheath and tendon region. He has full ability to perform a heel rise and his hindfoot does fall into varus. He has no crepitation. He has no instability. Neurovascular examination is intact. XR Results (most recent):  Results from Appointment encounter on 12/07/22    XR KNEES BI STAND    Narrative  No charge on this x-ray. It was supposed to be performed on a 3 view x-ray     XR Results (most recent):  Results from Appointment encounter on 12/07/22    XR KNEES BI STAND    Narrative  No charge on this x-ray. It was supposed to be performed on a 3 view x-ray      XR KNEE LT MIN 4 V    Narrative  Three-view x-ray of the left knee reveals a well-seated prosthesis. The sunrise view shows a perfectly matched patellofemoral joint. ASSESSMENT/PLAN:    In regards to the ankle, this most likely is correction of his valgus deformity and over stressing his posterior tib. Have asked him to buy an insert. I think this will resolve in a timely fashion. In regards to the knee, we will keep a close eye on the patella.   He has good stability of the patella here today. I suspect as his quadricep muscle begins to strengthen the stability will only improve. Patient is to continue with physical therapy and return to the office in 4 weeks.         Dave Ag MD

## 2022-12-07 NOTE — LETTER
12/7/2022    Patient: Jennifer Daly   YOB: 1970   Date of Visit: 12/7/2022     Kandis Leung MD  [address] [facility] [address] [address]  Via Fax: [phone]    Dear Kandis Leung MD,      Thank you for referring Mr. Nuha Simon to [facility] for evaluation. My notes for this consultation are attached. If you have questions, please do not hesitate to call me. I look forward to following your patient along with you.       Sincerely,    Angie Teresa MD

## 2023-01-04 ENCOUNTER — OFFICE VISIT (OUTPATIENT)
Dept: ORTHOPEDIC SURGERY | Age: 53
End: 2023-01-04
Payer: COMMERCIAL

## 2023-01-04 DIAGNOSIS — Z96.652 STATUS POST TOTAL KNEE REPLACEMENT, LEFT: Primary | ICD-10-CM

## 2023-01-04 DIAGNOSIS — Z96.652 HISTORY OF LEFT KNEE REPLACEMENT: Primary | ICD-10-CM

## 2023-01-04 PROCEDURE — 99024 POSTOP FOLLOW-UP VISIT: CPT | Performed by: ORTHOPAEDIC SURGERY

## 2023-01-04 RX ORDER — METHYLPREDNISOLONE 4 MG/1
TABLET ORAL
Qty: 1 DOSE PACK | Refills: 0 | Status: SHIPPED | OUTPATIENT
Start: 2023-01-04

## 2023-01-04 NOTE — LETTER
1/4/2023    Patient: Keith Mortensen   YOB: 1970   Date of Visit: 1/4/2023     Kiran Washington MD  [address] [place] [address] [address] [address]  Via Fax: [phone]    Dear Kiran Washington MD,      Thank you for referring Mr. Mariia Gutierres to [facility] for evaluation. My notes for this consultation are attached. If you have questions, please do not hesitate to call me. I look forward to following your patient along with you.       Sincerely,    Jorge Paez MD

## 2023-01-04 NOTE — PROGRESS NOTES
Alireza De La Rosa (: 1970) is a 47 y.o. male, patient, here for evaluation of the following chief complaint(s):  Knee Pain (Left knee pain )       HPI:    Patient presents to the office status post total knee replacement. Patient states he is doing better. He is ankle pain has also nearly fully resolved. He still notes a little bit of swelling particularly at the end of the day. Patient presents to the office status post total knee replacement. He is starting to feel much better        Allergies   Allergen Reactions    Percocet [Oxycodone-Acetaminophen] Other (comments)     vomiting       Current Outpatient Medications   Medication Sig    warfarin (Coumadin) 2.5 mg tablet Take 1 Tablet by mouth daily. dextroamphetamine-amphetamine (ADDERALL) 20 mg tablet Take 20 mg by mouth two (2) times a day. buPROPion SR (WELLBUTRIN SR) 150 mg SR tablet Take  by mouth two (2) times a day. propranolol LA (INDERAL LA) 60 mg SR capsule Take  by mouth nightly. liothyronine (CYTOMEL) 5 mcg tablet Take 5 mcg by mouth Daily (before breakfast). levothyroxine (SYNTHROID) 50 mcg tablet Take  by mouth Daily (before breakfast). methotrexate (RHEUMATREX) 2.5 mg tablet Take 20 mg by mouth every Wednesday. folic acid (FOLVITE) 1 mg tablet Take  by mouth daily. traZODone (DESYREL) 50 mg tablet Take  by mouth nightly as needed for Sleep. Cetirizine (ZyrTEC) 10 mg cap Take  by mouth nightly.    magnesium carb,citrate,oxide (Magnesium Complex) 300 mg magnesium tab Take  by mouth nightly. meclizine 50 mg tablet Take 50 mg by mouth two (2) times daily as needed. diazePAM (Valium) 5 mg tablet Take 1 Tab by mouth every twelve (12) hours as needed (vertigo). Max Daily Amount: 10 mg. MULTIVITAMIN PO Take  by mouth. CALCIUM CARBONATE (ANTACID CALCIUM PO) Take  by mouth as needed. No current facility-administered medications for this visit.        Past Medical History:   Diagnosis Date    Arthritis  Left knee    Autoimmune disease ([facility] 2020? Arthritis specialist    Depression     GERD (gastroesophageal reflux disease) 10/2014    As needed    Hypertension 2018? Secondary to mental health medications    Migraines     Migrainous vertigo     Nausea & vomiting 6/15/1989    After every surgery    Raynaud's disease     Thyroid disease 2018? Unsure of actual date        Past Surgical History:   Procedure Laterality Date    HX KNEE ARTHROSCOPY Left     X5    HX ORTHOPAEDIC Left 01/01/2006    Partial Replacement Knee    [place] OSTEOCHONDRAL KNEE ALLOGRAFT Left 2000       Family History   Problem Relation Age of Onset    No Known Problems Mother     Heart Disease Father         Mild    Hypertension Father         Mild    OSTEOARTHRITIS Father         Since he was adult    Arthritis-rheumatoid Father     Anesth Problems Father         PONV    No Known Problems Sister         Social History     Socioeconomic History    Marital status:      Spouse name: Not on file    Number of children: Not on file    Years of education: Not on file    Highest education level: Not on file   Occupational History    Not on file   Tobacco Use    Smoking status: Never    Smokeless tobacco: Never   Vaping Use    Vaping Use: Never used   Substance and Sexual Activity    Alcohol use:  Yes     Alcohol/week: 2.0 standard drinks     Types: 2 Cans of beer per week     Comment: socially    Drug use: No    Sexual activity: Yes     Partners: Female     Birth control/protection: Pill   Other Topics Concern    Not on file   Social History Narrative    Not on file     Social Determinants of Health     Financial Resource Strain: Not on file   Food Insecurity: Not on file   Transportation Needs: Not on file   Physical Activity: Not on file   Stress: Not on file   Social Connections: Not on file   Intimate Partner Violence: Not on file   Housing Stability: Not on file       Review of Systems   Musculoskeletal:         Left knee pain Vitals: There were no vitals taken for this visit. There is no height or weight on file to calculate BMI. Ortho Exam     Left knee: Incision is healed well. He has a small effusion. Range of motion 0-132 degrees. He has minimal to no pain with manipulation of the knee. He still carries a little bit of edema distally into the lower extremity. He has no ankle pain. No evidence of subluxation of the patella. Neurovascular examination is intact    ASSESSMENT/PLAN:    Patient is doing well. We will continue with physical therapy to advance his stamina and strength. We talked about use of compression sleeve to help control edema.   Patient is to return to the office in 4 weeks        Mau Abel MD

## 2023-02-01 ENCOUNTER — OFFICE VISIT (OUTPATIENT)
Dept: ORTHOPEDIC SURGERY | Age: 53
End: 2023-02-01
Payer: COMMERCIAL

## 2023-02-01 DIAGNOSIS — Z96.659 HISTORY OF TOTAL KNEE REPLACEMENT, UNSPECIFIED LATERALITY: Primary | ICD-10-CM

## 2023-02-01 PROCEDURE — 99024 POSTOP FOLLOW-UP VISIT: CPT | Performed by: ORTHOPAEDIC SURGERY

## 2023-02-01 NOTE — LETTER
2/1/2023    Patient: Dary Oconnor   YOB: 1970   Date of Visit: 2/1/2023     Margot Oneil MD  [address] [place] [address] [address]  [phone]  Via Fax: [phone]    Dear Margot Oneil MD,      Thank you for referring Mr. Landon Bliss to [facility] for evaluation. My notes for this consultation are attached. If you have questions, please do not hesitate to call me. I look forward to following your patient along with you.       Sincerely,    Pia Cash MD

## 2023-02-01 NOTE — PROGRESS NOTES
Alena New (: 1970) is a 47 y.o. male, patient, here for evaluation of the following chief complaint(s):  Knee Pain (Left knee post op )       HPI:    Patient returns to the office status post total knee replacement left. Patient is doing much better. He has been recently discharged from physical therapy. He has been happy with the knee. The swelling has been much more manageable. Allergies   Allergen Reactions    Percocet [Oxycodone-Acetaminophen] Other (comments)     vomiting       Current Outpatient Medications   Medication Sig    methylPREDNISolone (MEDROL DOSEPACK) 4 mg tablet Per dose pack instructions    warfarin (Coumadin) 2.5 mg tablet Take 1 Tablet by mouth daily. dextroamphetamine-amphetamine (ADDERALL) 20 mg tablet Take 20 mg by mouth two (2) times a day. buPROPion SR (WELLBUTRIN SR) 150 mg SR tablet Take  by mouth two (2) times a day. propranolol LA (INDERAL LA) 60 mg SR capsule Take  by mouth nightly. liothyronine (CYTOMEL) 5 mcg tablet Take 5 mcg by mouth Daily (before breakfast). levothyroxine (SYNTHROID) 50 mcg tablet Take  by mouth Daily (before breakfast). methotrexate (RHEUMATREX) 2.5 mg tablet Take 20 mg by mouth every Wednesday. folic acid (FOLVITE) 1 mg tablet Take  by mouth daily. traZODone (DESYREL) 50 mg tablet Take  by mouth nightly as needed for Sleep. Cetirizine (ZyrTEC) 10 mg cap Take  by mouth nightly.    magnesium carb,citrate,oxide (Magnesium Complex) 300 mg magnesium tab Take  by mouth nightly. meclizine 50 mg tablet Take 50 mg by mouth two (2) times daily as needed. diazePAM (Valium) 5 mg tablet Take 1 Tab by mouth every twelve (12) hours as needed (vertigo). Max Daily Amount: 10 mg. MULTIVITAMIN PO Take  by mouth. CALCIUM CARBONATE (ANTACID CALCIUM PO) Take  by mouth as needed. No current facility-administered medications for this visit.        Past Medical History:   Diagnosis Date    Arthritis     Left knee Autoimmune disease ([facility] 2020? Arthritis specialist    Depression     GERD (gastroesophageal reflux disease) 10/2014    As needed    Hypertension 2018? Secondary to mental health medications    Migraines     Migrainous vertigo     Nausea & vomiting 6/15/1989    After every surgery    Raynaud's disease     Thyroid disease 2018? Unsure of actual date        Past Surgical History:   Procedure Laterality Date    HX KNEE ARTHROSCOPY Left     X5    HX ORTHOPAEDIC Left 01/01/2006    Partial Replacement Knee    [place] OSTEOCHONDRAL ALLOGRAFT KNEE OPEN Left 2000       Family History   Problem Relation Age of Onset    No Known Problems Mother     Heart Disease Father         Mild    Hypertension Father         Mild    OSTEOARTHRITIS Father         Since he was adult    Arthritis-rheumatoid Father     Anesth Problems Father         PONV    No Known Problems Sister         Social History     Socioeconomic History    Marital status:      Spouse name: Not on file    Number of children: Not on file    Years of education: Not on file    Highest education level: Not on file   Occupational History    Not on file   Tobacco Use    Smoking status: Never    Smokeless tobacco: Never   Vaping Use    Vaping Use: Never used   Substance and Sexual Activity    Alcohol use: Yes     Alcohol/week: 2.0 standard drinks     Types: 2 Cans of beer per week     Comment: socially    Drug use: No    Sexual activity: Yes     Partners: Female     Birth control/protection: Pill   Other Topics Concern    Not on file   Social History Narrative    Not on file     Social Determinants of Health     Financial Resource Strain: Not on file   Food Insecurity: Not on file   Transportation Needs: Not on file   Physical Activity: Not on file   Stress: Not on file   Social Connections: Not on file   Intimate Partner Violence: Not on file   Housing Stability: Not on file       Review of Systems   Musculoskeletal:         Left knee      Vitals:   There were no vitals taken for this visit. There is no height or weight on file to calculate BMI. Ortho Exam     Left knee: Incision is healed. He has full range of motion of the knee joint. He has no instability. Patella is tracking well. Mild atrophy of his quad typical at this stage. Significant reduction of the soft tissue swelling. ASSESSMENT/PLAN:    Patient is doing quite well. I agree with release from structured physical therapy. He is to continue with a physician directed home exercise program.  I have asked him to avoid any elective dental procedures for the next 3 months. He is to return to the office in 3 months for repeat x-ray.         Jorge Harris MD

## 2023-05-03 ENCOUNTER — OFFICE VISIT (OUTPATIENT)
Dept: ORTHOPEDIC SURGERY | Age: 53
End: 2023-05-03

## 2023-05-03 VITALS — WEIGHT: 175 LBS | BODY MASS INDEX: 21.3 KG/M2

## 2023-05-03 DIAGNOSIS — S83.002A PATELLAR SUBLUXATION, LEFT, INITIAL ENCOUNTER: ICD-10-CM

## 2023-05-03 DIAGNOSIS — Z96.652 HISTORY OF TOTAL KNEE ARTHROPLASTY, LEFT: Primary | ICD-10-CM

## (undated) DEVICE — GOWN,NON-REINFORCED,XXL: Brand: MEDLINE

## (undated) DEVICE — CUSTOM CAST PD STR

## (undated) DEVICE — 3M™ STERI-DRAPE™ U-DRAPE 1015: Brand: STERI-DRAPE™

## (undated) DEVICE — BANDAGE COMPR M W6INXL10YD WHT BGE VELC E MTRX HK AND LOOP

## (undated) DEVICE — SOLUTION IRRIG 3000ML 0.9% SOD CHL USP UROMATIC PLAS CONT

## (undated) DEVICE — GLOVE ORANGE PI 8 1/2   MSG9085

## (undated) DEVICE — SOLUTION SURG PREP 26 CC PURPREP

## (undated) DEVICE — SOLUTION IRRIG 1000ML STRL H2O USP PLAS POUR BTL

## (undated) DEVICE — TUBING SUCT 12FR MAL ALUM SHFT FN CAP VENT UNIV CONN W/ OBT

## (undated) DEVICE — DRAPE,EXTREMITY,89X128,STERILE: Brand: MEDLINE

## (undated) DEVICE — PACK SURG PROC KNEE USER GPS

## (undated) DEVICE — PADDING CAST SPEC 6INX4YD COT --

## (undated) DEVICE — SCRUB DRY SURG EZ SCRUB BRUSH PREOPERATIVE GRN

## (undated) DEVICE — CONTAINER,SPECIMEN,3OZ,OR STRL: Brand: MEDLINE

## (undated) DEVICE — SUTURE MCRYL SZ 4-0 L27IN ABSRB UD L24MM PS-1 3/8 CIR PRIM Y935H

## (undated) DEVICE — 2108 SERIES SAGITTAL BLADE AGGRESSIVE  (25.0 X 1.19 X 85.0MM)

## (undated) DEVICE — SUTURE VCRL SZ 2 L54IN ABSRB UD L65MM TP-1 1/2 CIR J880T

## (undated) DEVICE — PIN EXT FIX SCHNZ 3 MM

## (undated) DEVICE — PIN HOLDER HEX DIA 3.2MM 3 INCH OPTETRAK

## (undated) DEVICE — STRYKER PERFORMANCE SERIES SAGITTAL BLADE: Brand: STRYKER PERFORMANCE SERIES

## (undated) DEVICE — TOTAL JOINT - SMH: Brand: MEDLINE INDUSTRIES, INC.

## (undated) DEVICE — TUBING, SUCTION, 1/4" X 12', STRAIGHT: Brand: MEDLINE

## (undated) DEVICE — SYR 20ML LL STRL LF --

## (undated) DEVICE — STRIP,CLOSURE,WOUND,MEDI-STRIP,1/2X4: Brand: MEDLINE

## (undated) DEVICE — HYPODERMIC SAFETY NEEDLE: Brand: MAGELLAN

## (undated) DEVICE — PENCIL ES L10FT COAT BLDE HOLSTER

## (undated) DEVICE — TAPE,CLOTH/SILK,CURAD,3"X10YD,LF,40/CS: Brand: CURAD

## (undated) DEVICE — SUTURE STRATAFIX SYMMETRIC PDS + SZ 1 L18IN ABSRB VLT L48MM SXPP1A400

## (undated) DEVICE — COVER,MAYO STAND,STERILE: Brand: MEDLINE